# Patient Record
Sex: FEMALE | Race: OTHER | HISPANIC OR LATINO | Employment: UNEMPLOYED | URBAN - METROPOLITAN AREA
[De-identification: names, ages, dates, MRNs, and addresses within clinical notes are randomized per-mention and may not be internally consistent; named-entity substitution may affect disease eponyms.]

---

## 2022-02-21 ENCOUNTER — ANESTHESIA (OUTPATIENT)
Dept: ENDOSCOPY | Facility: HOSPITAL | Age: 52
DRG: 381 | End: 2022-02-21
Payer: MEDICAID

## 2022-02-21 ENCOUNTER — HOSPITAL ENCOUNTER (INPATIENT)
Facility: HOSPITAL | Age: 52
LOS: 1 days | Discharge: HOME | DRG: 381 | End: 2022-02-22
Attending: EMERGENCY MEDICINE | Admitting: INTERNAL MEDICINE
Payer: MEDICAID

## 2022-02-21 ENCOUNTER — ANESTHESIA EVENT (OUTPATIENT)
Dept: ENDOSCOPY | Facility: HOSPITAL | Age: 52
DRG: 381 | End: 2022-02-21
Payer: MEDICAID

## 2022-02-21 DIAGNOSIS — K92.2 GI BLEED DUE TO NSAIDS: ICD-10-CM

## 2022-02-21 DIAGNOSIS — K92.2 ACUTE UPPER GI BLEED: Primary | ICD-10-CM

## 2022-02-21 DIAGNOSIS — R10.13 EPIGASTRIC PAIN: ICD-10-CM

## 2022-02-21 DIAGNOSIS — T39.395A GI BLEED DUE TO NSAIDS: ICD-10-CM

## 2022-02-21 DIAGNOSIS — K92.0 HEMATEMESIS WITH NAUSEA: ICD-10-CM

## 2022-02-21 DIAGNOSIS — D64.9 SYMPTOMATIC ANEMIA: ICD-10-CM

## 2022-02-21 PROBLEM — F15.10 AMPHETAMINE ABUSE: Status: ACTIVE | Noted: 2022-02-21

## 2022-02-21 PROBLEM — Z79.1 NSAID LONG-TERM USE: Chronic | Status: ACTIVE | Noted: 2022-02-21

## 2022-02-21 PROBLEM — K22.11 ULCER OF ESOPHAGUS WITH BLEEDING: Status: ACTIVE | Noted: 2022-02-21

## 2022-02-21 PROBLEM — I10 ESSENTIAL HYPERTENSION: Status: ACTIVE | Noted: 2022-02-21

## 2022-02-21 PROBLEM — D62 ACUTE BLOOD LOSS ANEMIA: Status: ACTIVE | Noted: 2022-02-21

## 2022-02-21 PROBLEM — Z79.1 NSAID LONG-TERM USE: Status: ACTIVE | Noted: 2022-02-21

## 2022-02-21 LAB
ABO + RH BLD: NORMAL
ALBUMIN SERPL BCP-MCNC: 2.8 G/DL (ref 3.5–5.2)
ALLENS TEST: ABNORMAL
ALP SERPL-CCNC: 103 U/L (ref 55–135)
ALT SERPL W/O P-5'-P-CCNC: 26 U/L (ref 10–44)
AMPHET+METHAMPHET UR QL: ABNORMAL
ANION GAP SERPL CALC-SCNC: 6 MMOL/L (ref 8–16)
APTT BLDCRRT: 22.8 SEC (ref 21–32)
AST SERPL-CCNC: 23 U/L (ref 10–40)
BARBITURATES UR QL SCN>200 NG/ML: NEGATIVE
BASOPHILS # BLD AUTO: 0.04 K/UL (ref 0–0.2)
BASOPHILS # BLD AUTO: 0.06 K/UL (ref 0–0.2)
BASOPHILS # BLD AUTO: 0.07 K/UL (ref 0–0.2)
BASOPHILS NFR BLD: 0.3 % (ref 0–1.9)
BASOPHILS NFR BLD: 0.4 % (ref 0–1.9)
BASOPHILS NFR BLD: 0.5 % (ref 0–1.9)
BENZODIAZ UR QL SCN>200 NG/ML: NEGATIVE
BILIRUB SERPL-MCNC: 0.1 MG/DL (ref 0.1–1)
BILIRUB UR QL STRIP: NEGATIVE
BLD GP AB SCN CELLS X3 SERPL QL: NORMAL
BLD PROD TYP BPU: NORMAL
BLD PROD TYP BPU: NORMAL
BLOOD UNIT EXPIRATION DATE: NORMAL
BLOOD UNIT EXPIRATION DATE: NORMAL
BLOOD UNIT TYPE CODE: 9500
BLOOD UNIT TYPE CODE: 9500
BLOOD UNIT TYPE: NORMAL
BLOOD UNIT TYPE: NORMAL
BUN SERPL-MCNC: 21 MG/DL (ref 6–20)
BZE UR QL SCN: NEGATIVE
CALCIUM SERPL-MCNC: 8.1 MG/DL (ref 8.7–10.5)
CANNABINOIDS UR QL SCN: NEGATIVE
CHLORIDE SERPL-SCNC: 107 MMOL/L (ref 95–110)
CLARITY UR: CLEAR
CO2 SERPL-SCNC: 25 MMOL/L (ref 23–29)
CODING SYSTEM: NORMAL
CODING SYSTEM: NORMAL
COLOR UR: YELLOW
CREAT SERPL-MCNC: 0.7 MG/DL (ref 0.5–1.4)
CREAT UR-MCNC: 30.5 MG/DL (ref 15–325)
CTP QC/QA: YES
DELSYS: ABNORMAL
DIFFERENTIAL METHOD: ABNORMAL
DISPENSE STATUS: NORMAL
DISPENSE STATUS: NORMAL
EOSINOPHIL # BLD AUTO: 0.1 K/UL (ref 0–0.5)
EOSINOPHIL # BLD AUTO: 0.1 K/UL (ref 0–0.5)
EOSINOPHIL # BLD AUTO: 0.2 K/UL (ref 0–0.5)
EOSINOPHIL NFR BLD: 0.4 % (ref 0–8)
EOSINOPHIL NFR BLD: 0.5 % (ref 0–8)
EOSINOPHIL NFR BLD: 1.2 % (ref 0–8)
ERYTHROCYTE [DISTWIDTH] IN BLOOD BY AUTOMATED COUNT: 15.7 % (ref 11.5–14.5)
ERYTHROCYTE [DISTWIDTH] IN BLOOD BY AUTOMATED COUNT: 16.2 % (ref 11.5–14.5)
ERYTHROCYTE [DISTWIDTH] IN BLOOD BY AUTOMATED COUNT: 16.3 % (ref 11.5–14.5)
EST. GFR  (AFRICAN AMERICAN): >60 ML/MIN/1.73 M^2
EST. GFR  (NON AFRICAN AMERICAN): >60 ML/MIN/1.73 M^2
GLUCOSE SERPL-MCNC: 136 MG/DL (ref 70–110)
GLUCOSE SERPL-MCNC: 145 MG/DL (ref 70–110)
GLUCOSE UR QL STRIP: NEGATIVE
HCO3 UR-SCNC: 23.5 MMOL/L (ref 24–28)
HCT VFR BLD AUTO: 18.1 % (ref 37–48.5)
HCT VFR BLD AUTO: 23.4 % (ref 37–48.5)
HCT VFR BLD AUTO: 27.4 % (ref 37–48.5)
HCT VFR BLD AUTO: 30.5 % (ref 37–48.5)
HCT VFR BLD CALC: <15 %PCV (ref 36–54)
HGB BLD-MCNC: 5.5 G/DL (ref 12–16)
HGB BLD-MCNC: 7.2 G/DL (ref 12–16)
HGB BLD-MCNC: 8.9 G/DL (ref 12–16)
HGB BLD-MCNC: 9.7 G/DL (ref 12–16)
HGB UR QL STRIP: NEGATIVE
IMM GRANULOCYTES # BLD AUTO: 0.21 K/UL (ref 0–0.04)
IMM GRANULOCYTES # BLD AUTO: 0.23 K/UL (ref 0–0.04)
IMM GRANULOCYTES # BLD AUTO: 0.3 K/UL (ref 0–0.04)
IMM GRANULOCYTES NFR BLD AUTO: 1.7 % (ref 0–0.5)
IMM GRANULOCYTES NFR BLD AUTO: 1.8 % (ref 0–0.5)
IMM GRANULOCYTES NFR BLD AUTO: 1.8 % (ref 0–0.5)
INR PPP: 0.9 (ref 0.8–1.2)
KETONES UR QL STRIP: NEGATIVE
LEUKOCYTE ESTERASE UR QL STRIP: NEGATIVE
LIPASE SERPL-CCNC: 53 U/L (ref 4–60)
LYMPHOCYTES # BLD AUTO: 1.6 K/UL (ref 1–4.8)
LYMPHOCYTES # BLD AUTO: 2.3 K/UL (ref 1–4.8)
LYMPHOCYTES # BLD AUTO: 2.6 K/UL (ref 1–4.8)
LYMPHOCYTES NFR BLD: 12.8 % (ref 18–48)
LYMPHOCYTES NFR BLD: 14.3 % (ref 18–48)
LYMPHOCYTES NFR BLD: 20.8 % (ref 18–48)
MCH RBC QN AUTO: 26.7 PG (ref 27–31)
MCH RBC QN AUTO: 26.9 PG (ref 27–31)
MCH RBC QN AUTO: 27.1 PG (ref 27–31)
MCHC RBC AUTO-ENTMCNC: 30.4 G/DL (ref 32–36)
MCHC RBC AUTO-ENTMCNC: 30.8 G/DL (ref 32–36)
MCHC RBC AUTO-ENTMCNC: 31.8 G/DL (ref 32–36)
MCV RBC AUTO: 85 FL (ref 82–98)
MCV RBC AUTO: 88 FL (ref 82–98)
MCV RBC AUTO: 88 FL (ref 82–98)
METHADONE UR QL SCN>300 NG/ML: NEGATIVE
MONOCYTES # BLD AUTO: 0.7 K/UL (ref 0.3–1)
MONOCYTES # BLD AUTO: 0.7 K/UL (ref 0.3–1)
MONOCYTES # BLD AUTO: 1.3 K/UL (ref 0.3–1)
MONOCYTES NFR BLD: 5.7 % (ref 4–15)
MONOCYTES NFR BLD: 5.8 % (ref 4–15)
MONOCYTES NFR BLD: 8.1 % (ref 4–15)
NEUTROPHILS # BLD AUTO: 10 K/UL (ref 1.8–7.7)
NEUTROPHILS # BLD AUTO: 12.3 K/UL (ref 1.8–7.7)
NEUTROPHILS # BLD AUTO: 8.9 K/UL (ref 1.8–7.7)
NEUTROPHILS NFR BLD: 70.2 % (ref 38–73)
NEUTROPHILS NFR BLD: 75 % (ref 38–73)
NEUTROPHILS NFR BLD: 78.7 % (ref 38–73)
NITRITE UR QL STRIP: NEGATIVE
NRBC BLD-RTO: 2 /100 WBC
NRBC BLD-RTO: 2 /100 WBC
NRBC BLD-RTO: 3 /100 WBC
NUM UNITS TRANS PACKED RBC: NORMAL
NUM UNITS TRANS PACKED RBC: NORMAL
OB PNL GAST: POSITIVE
OPIATES UR QL SCN: NEGATIVE
PCO2 BLDA: 40.5 MMHG (ref 35–45)
PCP UR QL SCN>25 NG/ML: NEGATIVE
PH SMN: 7.37 [PH] (ref 7.35–7.45)
PH UR STRIP: 7 [PH] (ref 5–8)
PLATELET # BLD AUTO: 234 K/UL (ref 150–450)
PLATELET # BLD AUTO: 250 K/UL (ref 150–450)
PLATELET # BLD AUTO: 308 K/UL (ref 150–450)
PLATELET BLD QL SMEAR: ABNORMAL
PMV BLD AUTO: 9.4 FL (ref 9.2–12.9)
PMV BLD AUTO: 9.8 FL (ref 9.2–12.9)
PMV BLD AUTO: 9.9 FL (ref 9.2–12.9)
PO2 BLDA: 91 MMHG (ref 80–100)
POC BE: -2 MMOL/L
POC IONIZED CALCIUM: 1.14 MMOL/L (ref 1.06–1.42)
POC SATURATED O2: 97 % (ref 95–100)
POTASSIUM BLD-SCNC: 3.4 MMOL/L (ref 3.5–5.1)
POTASSIUM SERPL-SCNC: 4 MMOL/L (ref 3.5–5.1)
PROT SERPL-MCNC: 5.8 G/DL (ref 6–8.4)
PROT UR QL STRIP: NEGATIVE
PROTHROMBIN TIME: 10.5 SEC (ref 9–12.5)
RBC # BLD AUTO: 2.06 M/UL (ref 4–5.4)
RBC # BLD AUTO: 2.66 M/UL (ref 4–5.4)
RBC # BLD AUTO: 3.61 M/UL (ref 4–5.4)
SAMPLE: ABNORMAL
SARS-COV-2 RDRP RESP QL NAA+PROBE: NEGATIVE
SITE: ABNORMAL
SODIUM BLD-SCNC: 140 MMOL/L (ref 136–145)
SODIUM SERPL-SCNC: 138 MMOL/L (ref 136–145)
SP GR UR STRIP: 1.01 (ref 1–1.03)
TOXICOLOGY INFORMATION: ABNORMAL
URN SPEC COLLECT METH UR: NORMAL
UROBILINOGEN UR STRIP-ACNC: NEGATIVE EU/DL
WBC # BLD AUTO: 12.67 K/UL (ref 3.9–12.7)
WBC # BLD AUTO: 12.68 K/UL (ref 3.9–12.7)
WBC # BLD AUTO: 16.36 K/UL (ref 3.9–12.7)

## 2022-02-21 PROCEDURE — 43239 PR EGD, FLEX, W/BIOPSY, SGL/MULTI: ICD-10-PCS | Mod: ,,, | Performed by: INTERNAL MEDICINE

## 2022-02-21 PROCEDURE — 27201028 HC NEEDLE, SCLERO: Performed by: INTERNAL MEDICINE

## 2022-02-21 PROCEDURE — P9016 RBC LEUKOCYTES REDUCED: HCPCS | Performed by: NURSE PRACTITIONER

## 2022-02-21 PROCEDURE — 96374 THER/PROPH/DIAG INJ IV PUSH: CPT | Mod: 59

## 2022-02-21 PROCEDURE — 85610 PROTHROMBIN TIME: CPT | Performed by: EMERGENCY MEDICINE

## 2022-02-21 PROCEDURE — 99291 CRITICAL CARE FIRST HOUR: CPT | Mod: 25

## 2022-02-21 PROCEDURE — 25000003 PHARM REV CODE 250: Performed by: INTERNAL MEDICINE

## 2022-02-21 PROCEDURE — 81003 URINALYSIS AUTO W/O SCOPE: CPT | Mod: 59 | Performed by: NURSE PRACTITIONER

## 2022-02-21 PROCEDURE — 25000003 PHARM REV CODE 250: Performed by: NURSE PRACTITIONER

## 2022-02-21 PROCEDURE — C9113 INJ PANTOPRAZOLE SODIUM, VIA: HCPCS | Performed by: EMERGENCY MEDICINE

## 2022-02-21 PROCEDURE — 96361 HYDRATE IV INFUSION ADD-ON: CPT | Mod: 59

## 2022-02-21 PROCEDURE — C9113 INJ PANTOPRAZOLE SODIUM, VIA: HCPCS | Performed by: NURSE PRACTITIONER

## 2022-02-21 PROCEDURE — 88305 TISSUE EXAM BY PATHOLOGIST: CPT | Mod: 26,,, | Performed by: PATHOLOGY

## 2022-02-21 PROCEDURE — 88305 TISSUE EXAM BY PATHOLOGIST: CPT | Mod: 59 | Performed by: PATHOLOGY

## 2022-02-21 PROCEDURE — 99291 CRITICAL CARE FIRST HOUR: CPT | Mod: ,,, | Performed by: NURSE PRACTITIONER

## 2022-02-21 PROCEDURE — 88305 TISSUE EXAM BY PATHOLOGIST: ICD-10-PCS | Mod: 26,,, | Performed by: PATHOLOGY

## 2022-02-21 PROCEDURE — 82271 OCCULT BLOOD OTHER SOURCES: CPT | Performed by: EMERGENCY MEDICINE

## 2022-02-21 PROCEDURE — 99223 1ST HOSP IP/OBS HIGH 75: CPT | Mod: 25,,, | Performed by: INTERNAL MEDICINE

## 2022-02-21 PROCEDURE — 27201038 HC PROBE, BI-POLAR: Performed by: INTERNAL MEDICINE

## 2022-02-21 PROCEDURE — 85014 HEMATOCRIT: CPT | Performed by: INTERNAL MEDICINE

## 2022-02-21 PROCEDURE — 25000003 PHARM REV CODE 250: Performed by: EMERGENCY MEDICINE

## 2022-02-21 PROCEDURE — 36415 COLL VENOUS BLD VENIPUNCTURE: CPT | Performed by: EMERGENCY MEDICINE

## 2022-02-21 PROCEDURE — 85025 COMPLETE CBC W/AUTO DIFF WBC: CPT | Performed by: EMERGENCY MEDICINE

## 2022-02-21 PROCEDURE — 80053 COMPREHEN METABOLIC PANEL: CPT | Performed by: EMERGENCY MEDICINE

## 2022-02-21 PROCEDURE — 99291 PR CRITICAL CARE, E/M 30-74 MINUTES: ICD-10-PCS | Mod: ,,, | Performed by: NURSE PRACTITIONER

## 2022-02-21 PROCEDURE — 85018 HEMOGLOBIN: CPT | Performed by: INTERNAL MEDICINE

## 2022-02-21 PROCEDURE — 63600175 PHARM REV CODE 636 W HCPCS: Performed by: INTERNAL MEDICINE

## 2022-02-21 PROCEDURE — 84295 ASSAY OF SERUM SODIUM: CPT

## 2022-02-21 PROCEDURE — 63600175 PHARM REV CODE 636 W HCPCS: Performed by: EMERGENCY MEDICINE

## 2022-02-21 PROCEDURE — 25000003 PHARM REV CODE 250: Performed by: NURSE ANESTHETIST, CERTIFIED REGISTERED

## 2022-02-21 PROCEDURE — 99223 PR INITIAL HOSPITAL CARE,LEVL III: ICD-10-PCS | Mod: 25,,, | Performed by: INTERNAL MEDICINE

## 2022-02-21 PROCEDURE — 86920 COMPATIBILITY TEST SPIN: CPT | Performed by: NURSE PRACTITIONER

## 2022-02-21 PROCEDURE — 43255 EGD CONTROL BLEEDING ANY: CPT | Mod: 59,,, | Performed by: INTERNAL MEDICINE

## 2022-02-21 PROCEDURE — 82800 BLOOD PH: CPT

## 2022-02-21 PROCEDURE — 43255 EGD CONTROL BLEEDING ANY: CPT | Performed by: INTERNAL MEDICINE

## 2022-02-21 PROCEDURE — 99900035 HC TECH TIME PER 15 MIN (STAT)

## 2022-02-21 PROCEDURE — 85014 HEMATOCRIT: CPT

## 2022-02-21 PROCEDURE — 83690 ASSAY OF LIPASE: CPT | Performed by: EMERGENCY MEDICINE

## 2022-02-21 PROCEDURE — 96375 TX/PRO/DX INJ NEW DRUG ADDON: CPT | Mod: 59

## 2022-02-21 PROCEDURE — 80307 DRUG TEST PRSMV CHEM ANLYZR: CPT | Performed by: NURSE PRACTITIONER

## 2022-02-21 PROCEDURE — 85730 THROMBOPLASTIN TIME PARTIAL: CPT | Performed by: EMERGENCY MEDICINE

## 2022-02-21 PROCEDURE — 86900 BLOOD TYPING SEROLOGIC ABO: CPT | Performed by: EMERGENCY MEDICINE

## 2022-02-21 PROCEDURE — 82803 BLOOD GASES ANY COMBINATION: CPT

## 2022-02-21 PROCEDURE — U0002 COVID-19 LAB TEST NON-CDC: HCPCS | Performed by: EMERGENCY MEDICINE

## 2022-02-21 PROCEDURE — 63600175 PHARM REV CODE 636 W HCPCS: Performed by: NURSE ANESTHETIST, CERTIFIED REGISTERED

## 2022-02-21 PROCEDURE — 63600175 PHARM REV CODE 636 W HCPCS: Performed by: NURSE PRACTITIONER

## 2022-02-21 PROCEDURE — 83036 HEMOGLOBIN GLYCOSYLATED A1C: CPT | Performed by: EMERGENCY MEDICINE

## 2022-02-21 PROCEDURE — 86920 COMPATIBILITY TEST SPIN: CPT | Performed by: EMERGENCY MEDICINE

## 2022-02-21 PROCEDURE — 82330 ASSAY OF CALCIUM: CPT

## 2022-02-21 PROCEDURE — 43239 EGD BIOPSY SINGLE/MULTIPLE: CPT | Mod: ,,, | Performed by: INTERNAL MEDICINE

## 2022-02-21 PROCEDURE — 84132 ASSAY OF SERUM POTASSIUM: CPT

## 2022-02-21 PROCEDURE — 43255 PR EGD, FLEX, W/CTRL BLEED, ANY METHOD: ICD-10-PCS | Mod: 59,,, | Performed by: INTERNAL MEDICINE

## 2022-02-21 PROCEDURE — 85025 COMPLETE CBC W/AUTO DIFF WBC: CPT | Mod: 91 | Performed by: NURSE PRACTITIONER

## 2022-02-21 PROCEDURE — P9016 RBC LEUKOCYTES REDUCED: HCPCS | Performed by: EMERGENCY MEDICINE

## 2022-02-21 PROCEDURE — 43239 EGD BIOPSY SINGLE/MULTIPLE: CPT | Performed by: INTERNAL MEDICINE

## 2022-02-21 PROCEDURE — 86850 RBC ANTIBODY SCREEN: CPT | Performed by: EMERGENCY MEDICINE

## 2022-02-21 PROCEDURE — 37000008 HC ANESTHESIA 1ST 15 MINUTES: Performed by: INTERNAL MEDICINE

## 2022-02-21 PROCEDURE — 37000009 HC ANESTHESIA EA ADD 15 MINS: Performed by: INTERNAL MEDICINE

## 2022-02-21 PROCEDURE — 20000000 HC ICU ROOM

## 2022-02-21 PROCEDURE — 36430 TRANSFUSION BLD/BLD COMPNT: CPT

## 2022-02-21 PROCEDURE — 36600 WITHDRAWAL OF ARTERIAL BLOOD: CPT

## 2022-02-21 PROCEDURE — 27201012 HC FORCEPS, HOT/COLD, DISP: Performed by: INTERNAL MEDICINE

## 2022-02-21 RX ORDER — ALBUTEROL SULFATE 90 UG/1
2 AEROSOL, METERED RESPIRATORY (INHALATION) EVERY 6 HOURS PRN
Status: ON HOLD | COMMUNITY
End: 2022-02-22

## 2022-02-21 RX ORDER — EPINEPHRINE 1 MG/ML
INJECTION, SOLUTION INTRACARDIAC; INTRAMUSCULAR; INTRAVENOUS; SUBCUTANEOUS
Status: COMPLETED | OUTPATIENT
Start: 2022-02-21 | End: 2022-02-21

## 2022-02-21 RX ORDER — PANTOPRAZOLE SODIUM 40 MG/10ML
80 INJECTION, POWDER, LYOPHILIZED, FOR SOLUTION INTRAVENOUS
Status: COMPLETED | OUTPATIENT
Start: 2022-02-21 | End: 2022-02-21

## 2022-02-21 RX ORDER — IBUPROFEN 200 MG
400 TABLET ORAL EVERY 6 HOURS PRN
Status: ON HOLD | COMMUNITY
End: 2022-02-22

## 2022-02-21 RX ORDER — HYDROCODONE BITARTRATE AND ACETAMINOPHEN 500; 5 MG/1; MG/1
TABLET ORAL
Status: DISCONTINUED | OUTPATIENT
Start: 2022-02-21 | End: 2022-02-21

## 2022-02-21 RX ORDER — ONDANSETRON 2 MG/ML
4 INJECTION INTRAMUSCULAR; INTRAVENOUS EVERY 8 HOURS PRN
Status: DISCONTINUED | OUTPATIENT
Start: 2022-02-21 | End: 2022-02-22 | Stop reason: HOSPADM

## 2022-02-21 RX ORDER — HYDROCODONE BITARTRATE AND ACETAMINOPHEN 5; 325 MG/1; MG/1
1 TABLET ORAL EVERY 6 HOURS PRN
Status: DISCONTINUED | OUTPATIENT
Start: 2022-02-21 | End: 2022-02-22 | Stop reason: HOSPADM

## 2022-02-21 RX ORDER — SUCRALFATE 1 G/10ML
1 SUSPENSION ORAL EVERY 6 HOURS
Status: DISCONTINUED | OUTPATIENT
Start: 2022-02-21 | End: 2022-02-22 | Stop reason: HOSPADM

## 2022-02-21 RX ORDER — LIDOCAINE HYDROCHLORIDE 10 MG/ML
INJECTION, SOLUTION EPIDURAL; INFILTRATION; INTRACAUDAL; PERINEURAL
Status: DISCONTINUED | OUTPATIENT
Start: 2022-02-21 | End: 2022-02-21

## 2022-02-21 RX ORDER — AMLODIPINE BESYLATE 10 MG/1
10 TABLET ORAL DAILY
Status: DISCONTINUED | OUTPATIENT
Start: 2022-02-21 | End: 2022-02-22 | Stop reason: HOSPADM

## 2022-02-21 RX ORDER — PANTOPRAZOLE SODIUM 40 MG/10ML
40 INJECTION, POWDER, LYOPHILIZED, FOR SOLUTION INTRAVENOUS 2 TIMES DAILY
Status: DISCONTINUED | OUTPATIENT
Start: 2022-02-22 | End: 2022-02-21 | Stop reason: SDUPTHER

## 2022-02-21 RX ORDER — HYDRALAZINE HYDROCHLORIDE 20 MG/ML
10 INJECTION INTRAMUSCULAR; INTRAVENOUS EVERY 6 HOURS PRN
Status: DISCONTINUED | OUTPATIENT
Start: 2022-02-21 | End: 2022-02-22 | Stop reason: HOSPADM

## 2022-02-21 RX ORDER — PROPOFOL 10 MG/ML
VIAL (ML) INTRAVENOUS
Status: DISCONTINUED | OUTPATIENT
Start: 2022-02-21 | End: 2022-02-21

## 2022-02-21 RX ORDER — PANTOPRAZOLE SODIUM 40 MG/10ML
40 INJECTION, POWDER, LYOPHILIZED, FOR SOLUTION INTRAVENOUS 2 TIMES DAILY
Status: DISCONTINUED | OUTPATIENT
Start: 2022-02-21 | End: 2022-02-22 | Stop reason: HOSPADM

## 2022-02-21 RX ORDER — EPINEPHRINE 1 MG/ML
1 INJECTION, SOLUTION INTRACARDIAC; INTRAMUSCULAR; INTRAVENOUS; SUBCUTANEOUS ONCE
Status: DISCONTINUED | OUTPATIENT
Start: 2022-02-21 | End: 2022-02-22 | Stop reason: HOSPADM

## 2022-02-21 RX ORDER — SODIUM CHLORIDE 9 MG/ML
INJECTION, SOLUTION INTRAVENOUS CONTINUOUS
Status: DISCONTINUED | OUTPATIENT
Start: 2022-02-21 | End: 2022-02-22 | Stop reason: HOSPADM

## 2022-02-21 RX ORDER — MUPIROCIN 20 MG/G
OINTMENT TOPICAL 2 TIMES DAILY
Status: DISCONTINUED | OUTPATIENT
Start: 2022-02-21 | End: 2022-02-22 | Stop reason: HOSPADM

## 2022-02-21 RX ADMIN — LIDOCAINE HYDROCHLORIDE 50 MG: 10 INJECTION, SOLUTION EPIDURAL; INFILTRATION; INTRACAUDAL; PERINEURAL at 02:02

## 2022-02-21 RX ADMIN — PROMETHAZINE HYDROCHLORIDE 25 MG: 25 INJECTION INTRAMUSCULAR; INTRAVENOUS at 09:02

## 2022-02-21 RX ADMIN — SUCRALFATE 1 G: 1 SUSPENSION ORAL at 04:02

## 2022-02-21 RX ADMIN — MUPIROCIN: 20 OINTMENT TOPICAL at 09:02

## 2022-02-21 RX ADMIN — AMLODIPINE BESYLATE 10 MG: 10 TABLET ORAL at 04:02

## 2022-02-21 RX ADMIN — HYDRALAZINE HYDROCHLORIDE 10 MG: 20 INJECTION, SOLUTION INTRAMUSCULAR; INTRAVENOUS at 06:02

## 2022-02-21 RX ADMIN — PANTOPRAZOLE SODIUM 40 MG: 40 INJECTION, POWDER, FOR SOLUTION INTRAVENOUS at 09:02

## 2022-02-21 RX ADMIN — SODIUM CHLORIDE: 0.9 INJECTION, SOLUTION INTRAVENOUS at 11:02

## 2022-02-21 RX ADMIN — EPINEPHRINE 0.3 MG: 1 INJECTION, SOLUTION INTRAMUSCULAR; SUBCUTANEOUS at 03:02

## 2022-02-21 RX ADMIN — PROPOFOL 50 MG: 10 INJECTION, EMULSION INTRAVENOUS at 02:02

## 2022-02-21 RX ADMIN — PANTOPRAZOLE SODIUM 80 MG: 40 INJECTION, POWDER, FOR SOLUTION INTRAVENOUS at 09:02

## 2022-02-21 RX ADMIN — SUCRALFATE 1 G: 1 SUSPENSION ORAL at 11:02

## 2022-02-21 RX ADMIN — PROPOFOL 50 MG: 10 INJECTION, EMULSION INTRAVENOUS at 03:02

## 2022-02-21 RX ADMIN — PROPOFOL 40 MG: 10 INJECTION, EMULSION INTRAVENOUS at 03:02

## 2022-02-21 RX ADMIN — SODIUM CHLORIDE 2000 ML: 0.9 INJECTION, SOLUTION INTRAVENOUS at 09:02

## 2022-02-21 NOTE — ED NOTES
Per GI, bolus rest of blood into pt. Hospital medicine notified of rate change. No signs of reaction

## 2022-02-21 NOTE — CONSULTS
[facility] - Emergency Dept.  Gastroenterology  Consult Note    Patient Name: Kristen Gates  MRN: [id number]  Admission Date: 2/21/2022  Hospital Length of Stay: 0 days  Code Status: No Order   Attending Provider: Ирина Carlson MD   Consulting Provider: Ottoniel Jaramillo PA-C  Primary Care Physician: No primary care provider on file.  Principal Problem:Hematemesis with nausea    Inpatient consult to Gastroenterology  Consult performed by: Ottoniel Jaramillo PA-C  Consult ordered by: Ирина Carlson MD  Reason for consult: Upper GI bleed        Subjective:     HPI:  The patient presented to the ER for weakness. Symptoms started three days ago. She describes nausea, decreased appetite, black stool and weakness. In the ER, she vomited coffee ground emesis several times. Labs showed a Hgb of 5.5. She is receiving emergent RBCs now. BUN 21 and creatinine 0.7. Vitals stable. She reports taking Ibuprofen three times a day for right leg pain.       No past medical history on file.    No past surgical history on file.    Review of patient's allergies indicates:  Not on File  Family History    None       Tobacco Use    Smoking status: Not on file    Smokeless tobacco: Not on file   Substance and Sexual Activity    Alcohol use: Not on file    Drug use: Not on file    Sexual activity: Not on file     Review of Systems   Constitutional:  Negative for fever.   HENT:  Negative for hearing loss.    Eyes:  Negative for visual disturbance.   Respiratory:  Positive for shortness of breath. Negative for cough.    Cardiovascular:  Negative for chest pain.   Gastrointestinal:         As per HPI.   Genitourinary:  Negative for dysuria, frequency and hematuria.   Musculoskeletal:  Positive for arthralgias and back pain.   Skin:  Negative for rash.   Neurological:  Positive for weakness. Negative for seizures, syncope, numbness and headaches.   Hematological:  Does not bruise/bleed easily.   Psychiatric/Behavioral:  The patient is not  nervous/anxious.    Objective:     Vital Signs (Most Recent):  Temp: 97.9 °F (36.6 °C) (02/21/22 0952)  Pulse: 82 (02/21/22 0952)  Resp: (!) 21 (02/21/22 0952)  BP: (!) 152/81 (02/21/22 0952)  SpO2: 100 % (02/21/22 0952)   Vital Signs (24h Range):  Temp:  [97.9 °F (36.6 °C)-98.4 °F (36.9 °C)] 97.9 °F (36.6 °C)  Pulse:  [77-85] 82  Resp:  [10-21] 21  SpO2:  [97 %-100 %] 100 %  BP: (129-152)/(81-90) 152/81     Weight: 80.4 kg (177 lb 4 oz) (02/21/22 0918)  Body mass index is 29.5 kg/m².      Intake/Output Summary (Last 24 hours) at 2/21/2022 1021  Last data filed at 2/21/2022 1013  Gross per 24 hour   Intake 1991.67 ml   Output --   Net 1991.67 ml       Lines/Drains/Airways       Peripheral Intravenous Line  Duration                  Peripheral IV - Single Lumen 02/21/22 0000 18 G Right Antecubital <1 day         Peripheral IV - Single Lumen 02/21/22 0919 18 G Left Antecubital <1 day                    Physical Exam  Constitutional:       Appearance: Normal appearance. She is well-developed. She is ill-appearing.   HENT:      Head: Normocephalic and atraumatic.      Mouth/Throat:      Dentition: Abnormal dentition.   Eyes:      Extraocular Movements: Extraocular movements intact.   Cardiovascular:      Rate and Rhythm: Normal rate and regular rhythm.      Heart sounds: No murmur heard.  Pulmonary:      Effort: Pulmonary effort is normal. No respiratory distress.      Breath sounds: Normal breath sounds. No wheezing.   Abdominal:      General: Bowel sounds are normal. There is no distension.      Palpations: Abdomen is soft. There is no mass.      Tenderness: There is abdominal tenderness in the epigastric area.   Musculoskeletal:      Cervical back: Normal range of motion and neck supple.      Right lower leg: No edema.      Left lower leg: No edema.   Skin:     General: Skin is warm and dry.      Findings: No rash.   Neurological:      Mental Status: She is alert and oriented to person, place, and time.      Cranial  Nerves: No cranial nerve deficit.   Psychiatric:         Behavior: Behavior normal.       Significant Labs:  CBC:   Recent Labs   Lab 02/21/22 0921   WBC 12.67   HGB 5.5*   HCT 18.1*        CMP:   Recent Labs   Lab 02/21/22 0921   *   CALCIUM 8.1*   ALBUMIN 2.8*   PROT 5.8*      K 4.0   CO2 25      BUN 21*   CREATININE 0.7   ALKPHOS 103   ALT 26   AST 23   BILITOT 0.1     Coagulation:   Recent Labs   Lab 02/21/22 0921   INR 0.9   APTT 22.8       Significant Imaging:  Imaging results within the past 24 hours have been reviewed.    Assessment/Plan:     * Hematemesis with nausea  The patient has coffee ground emesis and melena in the setting of chronic NSAID use. Upper GI bleed suspected.   Agree with blood transfusion and IV Protonix.   Will plan on EGD today or tomorrow depending on resuscitation.   Keep NPO.      Acute blood loss anemia  Continue to monitor H/H and transfuse additional units as needed.     NSAID long-term use  Avoid NSAIDs.        Thank you for your consult. I will follow-up with patient. Please contact us if you have any additional questions.    Ottoniel Jaramillo PA-C  Gastroenterology  [facility] - Emergency Dept.

## 2022-02-21 NOTE — ANESTHESIA PREPROCEDURE EVALUATION
02/21/2022  Kristen Gates is a 51 y.o., female.      Pre-op Assessment    I have reviewed the Patient Summary Reports.     I have reviewed the Nursing Notes. I have reviewed the NPO Status.   I have reviewed the Medications.     Review of Systems  Anesthesia Hx:  No problems with previous Anesthesia    Social:  Non-Smoker, No Alcohol Use    Hematology/Oncology:     Oncology Normal    -- Anemia:   EENT/Dental:EENT/Dental Normal   Cardiovascular:  Cardiovascular Normal     Pulmonary:   Asthma mild    Renal/:  Renal/ Normal     Hepatic/GI:  Hepatic/GI Normal    Musculoskeletal:  Musculoskeletal Normal    Neurological:  Neurology Normal    Endocrine:  Endocrine Normal    Dermatological:  Skin Normal    Psych:   anxiety depression          Physical Exam  General: Well nourished    Airway:  Mallampati: II   Mouth Opening: Normal  TM Distance: Normal  Neck ROM: Normal ROM    Dental:  Periodontal disease    Chest/Lungs:  Normal Respiratory Rate    Heart:  Rate: Normal        Anesthesia Plan  Type of Anesthesia, risks & benefits discussed:    Anesthesia Type: MAC  Intra-op Monitoring Plan: Standard ASA Monitors  Post Op Pain Control Plan: multimodal analgesia  Induction:  IV  Airway Plan: Direct, Post-Induction  Informed Consent: Informed consent signed with the Patient and all parties understand the risks and agree with anesthesia plan.  All questions answered. Patient consented to blood products? Yes  ASA Score: 3 Emergent  Day of Surgery Review of History & Physical: I have interviewed and examined the patient. I have reviewed the patient's H&P dated: There are no significant changes.     Ready For Surgery From Anesthesia Perspective.     .

## 2022-02-21 NOTE — TRANSFER OF CARE
"Anesthesia Transfer of Care Note    Patient: Kristen Gates    Procedure(s) Performed: Procedure(s) (LRB):  EGD (ESOPHAGOGASTRODUODENOSCOPY) (N/A)    Patient location: ICU    Anesthesia Type: MAC    Transport from OR: Transported from OR on room air with adequate spontaneous ventilation    Post pain: adequate analgesia    Post assessment: no apparent anesthetic complications and tolerated procedure well    Post vital signs: stable    Level of consciousness: awake, alert and oriented    Nausea/Vomiting: no nausea/vomiting    Complications: none    Transfer of care protocol was followed      Last vitals:   Visit Vitals  BP (!) 175/98   Pulse 79   Temp 36.8 °C (98.2 °F) (Oral)   Resp 16   Ht 5' 5" (1.651 m)   Wt 80.3 kg (177 lb)   LMP  (LMP Unknown)   SpO2 100%   Breastfeeding No   BMI 29.45 kg/m²     "

## 2022-02-21 NOTE — H&P
[facility] - Emergency Dept.  [facility] Medicine  History & Physical    Patient Name: Kristen Gates  MRN: [id number]  Patient Class: IP- Inpatient  Admission Date: 2022  Attending Physician: Wilbert Carroll MD  Primary Care Provider: Primary Doctor No         Patient information was obtained from patient, relative(s) and ER records.     Subjective:     Principal Problem:Hematemesis with nausea    Chief Complaint:   Chief Complaint   Patient presents with    Fatigue     Weakness x 3 days        HPI: Kristen Gates is a 51 y.o. female who presented to the Emergency Department today with c/o generalized weakness for the last 3 days days. It has been constant and of moderate severity.  Aggravating factors include none. Alleviating factors include none. Associated symptoms include fatigue, melena, epigastric abdominal pain, nausea, and hematemesis. Endorses chronic daily NSAID use of ASA and ibuprofen.      Full Code. Lora Covington, daughter in [place], and Julieta Covington daugher who lives local.      History reviewed. No pertinent past medical history.    Past Surgical History:   Procedure Laterality Date    APPENDECTOMY       SECTION      TUBAL LIGATION         Review of patient's allergies indicates:  No Known Allergies    No current facility-administered medications on file prior to encounter.     No current outpatient medications on file prior to encounter.     Family History       Problem Relation (Age of Onset)    Asthma Brother    Diabetes Mother, Sister, Brother    Hypertension Mother, Sister, Brother    No Known Problems Father, Daughter, Daughter, Daughter          Tobacco Use    Smoking status: Never Smoker    Smokeless tobacco: Never Used   Substance and Sexual Activity    Alcohol use: Not Currently    Drug use: Not Currently    Sexual activity: Not Currently     Review of Systems   Constitutional:  Positive for activity change, appetite change and fatigue. Negative for chills,  diaphoresis and fever.   HENT:  Negative for postnasal drip, sinus pressure and trouble swallowing.         +dental carries   Eyes:  Negative for visual disturbance.   Respiratory:  Negative for cough, chest tightness, shortness of breath and wheezing.    Cardiovascular:  Positive for palpitations. Negative for chest pain and leg swelling.   Gastrointestinal:  Positive for abdominal pain, blood in stool, nausea and vomiting. Negative for abdominal distention, constipation and diarrhea.        +melena  + hematemesis   Genitourinary:  Negative for difficulty urinating, dysuria, flank pain, hematuria and urgency.   Musculoskeletal:  Positive for myalgias (generalized). Negative for gait problem.   Skin:  Negative for rash and wound.   Neurological:  Positive for dizziness, weakness (generalized), light-headedness and headaches.   Psychiatric/Behavioral:  Negative for behavioral problems, decreased concentration, dysphoric mood and sleep disturbance. The patient is nervous/anxious.    All other systems reviewed and are negative.  Objective:     Vital Signs (Most Recent):  Temp: 97.9 °F (36.6 °C) (02/21/22 1010)  Pulse: 79 (02/21/22 1033)  Resp: 20 (02/21/22 1033)  BP: (!) 158/90 (02/21/22 1033)  SpO2: 100 % (02/21/22 1033)   Vital Signs (24h Range):  Temp:  [97.9 °F (36.6 °C)-98.4 °F (36.9 °C)] 97.9 °F (36.6 °C)  Pulse:  [75-85] 79  Resp:  [10-25] 20  SpO2:  [97 %-100 %] 100 %  BP: (129-184)/() 158/90     Weight: 80.4 kg (177 lb 4 oz)  Body mass index is 29.5 kg/m².    Physical Exam  Vitals and nursing note reviewed.   Constitutional:       General: She is awake. She is not in acute distress.     Appearance: She is obese. She is ill-appearing and toxic-appearing.   HENT:      Head: Normocephalic and atraumatic.      Comments: Pale mucous membranes     Mouth/Throat:      Mouth: Mucous membranes are dry.      Dentition: Abnormal dentition. Dental caries present.      Comments: Poor dentition, several decaying and  missing teeth  Eyes:      Extraocular Movements: Extraocular movements intact.      Conjunctiva/sclera: Conjunctivae normal.   Cardiovascular:      Rate and Rhythm: Regular rhythm. Tachycardia present.      Pulses: Normal pulses.      Heart sounds: Normal heart sounds. No murmur heard.  Pulmonary:      Effort: Pulmonary effort is normal. Tachypnea present. No accessory muscle usage or respiratory distress.      Breath sounds: Normal breath sounds. No wheezing or rales.   Abdominal:      General: Bowel sounds are normal.      Tenderness: There is abdominal tenderness.   Genitourinary:     Comments: deferred  Musculoskeletal:         General: Normal range of motion.      Cervical back: Normal range of motion.   Skin:     General: Skin is cool.      Capillary Refill: Capillary refill takes more than 3 seconds.      Coloration: Skin is ashen and pale.   Neurological:      Mental Status: She is alert and oriented to person, place, and time.   Psychiatric:         Mood and Affect: Mood is anxious. Affect is tearful.         Behavior: Behavior is cooperative.           Significant Labs: All pertinent labs within the past 24 hours have been reviewed.  Recent Lab Results         02/21/22  1031   02/21/22  1003   02/21/22  0931   02/21/22  0921        Unit Blood Type Code       9500  [P]              9500  [P]       Unit Expiration       [id number]  [P]              [id number]  [P]       Unit Blood Type       O NEG  [P]              O NEG  [P]       Albumin       2.8       Alkaline Phosphatase       103       ALT       26       Anion Gap       6       aPTT       22.8  Comment: aPTT therapeutic range = 39-69 seconds       AST       23       Baso # 0.06       0.04       Basophil % 0.5       0.3       BILIRUBIN TOTAL       0.1  Comment: For infants and newborns, interpretation of results should be based  on gestational age, weight and in agreement with clinical  observations.    Premature Infant recommended reference  ranges:  Up to 24 hours.............<8.0 mg/dL  Up to 48 hours............<12.0 mg/dL  3-5 days..................<15.0 mg/dL  6-29 days.................<15.0 mg/dL         BUN       21       Calcium       8.1       Chloride       107       CO2       25       CODING SYSTEM       SMRW450  [P]              [id number]  [P]       Creatinine       0.7       Differential Method Automated       Automated       DISPENSE STATUS       ISSUED  [P]              ISSUED  [P]       eGFR if        >60       eGFR if non        >60  Comment: Calculation used to obtain the estimated glomerular filtration  rate (eGFR) is the CKD-EPI equation.          Eos # 0.1       0.2       Eosinophil % 0.5       1.2       Glucose       145       Gran # (ANC) 10.0       8.9       Gran % 78.7       70.2       Group & Rh       B POS       Hematocrit 23.4       18.1  Comment: Results confirmed, test repeated  HGB/HCT  critical result(s) called and verbal readback obtained from   OMID ESCOBAR RN  by OBDULIA 02/21/2022 09:39         Hemoglobin 7.2       5.5  Comment: Results confirmed, test repeated  HGB/HCT  critical result(s) called and verbal readback obtained from   OMID ESCOBAR RN  by OBDULIA 02/21/2022 09:39         Immature Grans (Abs) 0.21  Comment: Mild elevation in immature granulocytes is non specific and   can be seen in a variety of conditions including stress response,   acute inflammation, trauma and pregnancy. Correlation with other   laboratory and clinical findings is essential.         0.23  Comment: Mild elevation in immature granulocytes is non specific and   can be seen in a variety of conditions including stress response,   acute inflammation, trauma and pregnancy. Correlation with other   laboratory and clinical findings is essential.         Immature Granulocytes 1.7       1.8       INDIRECT DOUGLAS       NEG       INR       0.9  Comment: Coumadin Therapy:  2.0 - 3.0 for INR for all indicators except  mechanical heart valves  and antiphospholipid syndromes which should use 2.5 - 3.5.         Lipase       53       Lymph # 1.6       2.6       Lymph % 12.8       20.8       MCH 27.1       26.7       MCHC 30.8       30.4       MCV 88       88       Mono # 0.7       0.7       Mono % 5.8       5.7       MPV 9.9       9.4       nRBC 2       3       Occult Blood     Positive         Platelet Estimate       Appears normal       Platelets 234       308       Potassium       4.0       Product Code       [id number]  [P]              [id number]  [P]       PROTEIN TOTAL       5.8       Protime       10.5        Acceptable   Yes           RBC 2.66       2.06       RDW 15.7       16.2       SARS-CoV-2 RNA, Amplification, Qual   Negative           Sodium       138       UNIT NUMBER       [id number]  [P]              [id number]  [P]       WBC 12.68       12.67                [P] - Preliminary Result               Significant Imaging: I have reviewed all pertinent imaging results/findings within the past 24 hours.  Imaging Results              X-Ray Abdomen Flat And Erect (In process)  Result time 02/21/22 10:27:21                      Assessment/Plan:     * Hematemesis with nausea  See below      Acute blood loss anemia  R/t GI bleeding- hematemesis, and melena  PPI IV BID   GI plans to scope today  2 units of emergent blood in Emergency Department  Prepare 2 additional and transfuse 1 for now  CBC TID  Transfuse additional units as needed    Recent Labs   Lab 02/21/22  1031   HGB 7.2*   HCT 23.4*   2/21/22 0921   H 5.5   H 18.1    NSAID long-term use  Taking ASA and ibuprofen several times daily for several years  counseled on need for cessation    VTE Risk Mitigation (From admission, onward)         Ordered     Reason for No Pharmacological VTE Prophylaxis  Once        Question:  Reasons:  Answer:  Active Bleeding    02/21/22 1046     IP VTE HIGH RISK PATIENT  Once         02/21/22 1046     Place sequential  compression device  Until discontinued         02/21/22 1046                   Lauren Sage NP  Department of Hospital Medicine   [facility] - Emergency Dept.

## 2022-02-21 NOTE — ASSESSMENT & PLAN NOTE
The patient has coffee ground emesis and melena in the setting of chronic NSAID use. Upper GI bleed suspected.   Agree with blood transfusion and IV Protonix.   Will plan on EGD today or tomorrow depending on resuscitation.   Keep NPO.

## 2022-02-21 NOTE — INTERVAL H&P NOTE
The patient has been examined and the H&P has been reviewed:    I concur with the findings and no changes have occurred since H&P was written.    Anesthesia/Surgery risks, benefits and alternative options discussed and understood by patient/family.          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

## 2022-02-21 NOTE — HPI
The patient presented to the ER for weakness. Symptoms started three days ago. She describes nausea, decreased appetite, black stool and weakness. In the ER, she vomited coffee ground emesis several times. Labs showed a Hgb of 5.5. She is receiving emergent RBCs now. BUN 21 and creatinine 0.7. Vitals stable. She reports taking Ibuprofen three times a day for right leg pain.

## 2022-02-21 NOTE — ASSESSMENT & PLAN NOTE
R/t GI bleeding- hematemesis, and melena  PPI IV BID   GI plans to scope today  2 units of emergent blood in Emergency Department  Prepare 2 additional and transfuse 1 for now  CBC TID  Transfuse additional units as needed    Recent Labs   Lab 02/21/22  1031   HGB 7.2*   HCT 23.4*   2/21/22 0921   H 5.5   H 18.1

## 2022-02-21 NOTE — HOSPITAL COURSE
2/21 - Admitted to ICU.  EGD done upon arrival revealing esophageal ulcer with visible vessel in the background of esophagitis treated with Epi inj.  She received 3 units PRBCs and Hgb up to 8.9.  On exam she is supine in bed fully AA&OX3 in no distress with VSS.

## 2022-02-21 NOTE — SUBJECTIVE & OBJECTIVE
No past medical history on file.    No past surgical history on file.    Review of patient's allergies indicates:  Not on File  Family History    None       Tobacco Use    Smoking status: Not on file    Smokeless tobacco: Not on file   Substance and Sexual Activity    Alcohol use: Not on file    Drug use: Not on file    Sexual activity: Not on file     Review of Systems   Constitutional:  Negative for fever.   HENT:  Negative for hearing loss.    Eyes:  Negative for visual disturbance.   Respiratory:  Positive for shortness of breath. Negative for cough.    Cardiovascular:  Negative for chest pain.   Gastrointestinal:         As per HPI.   Genitourinary:  Negative for dysuria, frequency and hematuria.   Musculoskeletal:  Positive for arthralgias and back pain.   Skin:  Negative for rash.   Neurological:  Positive for weakness. Negative for seizures, syncope, numbness and headaches.   Hematological:  Does not bruise/bleed easily.   Psychiatric/Behavioral:  The patient is not nervous/anxious.    Objective:     Vital Signs (Most Recent):  Temp: 97.9 °F (36.6 °C) (02/21/22 0952)  Pulse: 82 (02/21/22 0952)  Resp: (!) 21 (02/21/22 0952)  BP: (!) 152/81 (02/21/22 0952)  SpO2: 100 % (02/21/22 0952)   Vital Signs (24h Range):  Temp:  [97.9 °F (36.6 °C)-98.4 °F (36.9 °C)] 97.9 °F (36.6 °C)  Pulse:  [77-85] 82  Resp:  [10-21] 21  SpO2:  [97 %-100 %] 100 %  BP: (129-152)/(81-90) 152/81     Weight: 80.4 kg (177 lb 4 oz) (02/21/22 0918)  Body mass index is 29.5 kg/m².      Intake/Output Summary (Last 24 hours) at 2/21/2022 1021  Last data filed at 2/21/2022 1013  Gross per 24 hour   Intake 1991.67 ml   Output --   Net 1991.67 ml       Lines/Drains/Airways       Peripheral Intravenous Line  Duration                  Peripheral IV - Single Lumen 02/21/22 0000 18 G Right Antecubital <1 day         Peripheral IV - Single Lumen 02/21/22 0919 18 G Left Antecubital <1 day                    Physical Exam  Constitutional:        Appearance: Normal appearance. She is well-developed. She is ill-appearing.   HENT:      Head: Normocephalic and atraumatic.      Mouth/Throat:      Dentition: Abnormal dentition.   Eyes:      Extraocular Movements: Extraocular movements intact.   Cardiovascular:      Rate and Rhythm: Normal rate and regular rhythm.      Heart sounds: No murmur heard.  Pulmonary:      Effort: Pulmonary effort is normal. No respiratory distress.      Breath sounds: Normal breath sounds. No wheezing.   Abdominal:      General: Bowel sounds are normal. There is no distension.      Palpations: Abdomen is soft. There is no mass.      Tenderness: There is abdominal tenderness in the epigastric area.   Musculoskeletal:      Cervical back: Normal range of motion and neck supple.      Right lower leg: No edema.      Left lower leg: No edema.   Skin:     General: Skin is warm and dry.      Findings: No rash.   Neurological:      Mental Status: She is alert and oriented to person, place, and time.      Cranial Nerves: No cranial nerve deficit.   Psychiatric:         Behavior: Behavior normal.       Significant Labs:  CBC:   Recent Labs   Lab 02/21/22  0921   WBC 12.67   HGB 5.5*   HCT 18.1*        CMP:   Recent Labs   Lab 02/21/22  0921   *   CALCIUM 8.1*   ALBUMIN 2.8*   PROT 5.8*      K 4.0   CO2 25      BUN 21*   CREATININE 0.7   ALKPHOS 103   ALT 26   AST 23   BILITOT 0.1     Coagulation:   Recent Labs   Lab 02/21/22  0921   INR 0.9   APTT 22.8       Significant Imaging:  Imaging results within the past 24 hours have been reviewed.

## 2022-02-21 NOTE — Clinical Note
Laura accompanied their mother to the emergency department on 2/21/2022. They may return to work on 02/22/2022.      If you have any questions or concerns, please don't hesitate to call.      Sagar FARFAN RN

## 2022-02-21 NOTE — SUBJECTIVE & OBJECTIVE
Past Medical History:   Diagnosis Date    Asthma        Past Surgical History:   Procedure Laterality Date    APPENDECTOMY       SECTION      TUBAL LIGATION         Review of patient's allergies indicates:  No Known Allergies    Family History       Problem Relation (Age of Onset)    Asthma Brother    Diabetes Mother, Sister, Brother    Hypertension Mother, Sister, Brother    No Known Problems Father, Daughter, Daughter, Daughter          Tobacco Use    Smoking status: Never Smoker    Smokeless tobacco: Never Used   Substance and Sexual Activity    Alcohol use: Not Currently    Drug use: Not Currently    Sexual activity: Not Currently         Review of Systems   Constitutional:  Positive for appetite change and fatigue. Negative for chills, diaphoresis and fever.   HENT:  Negative for congestion.    Respiratory:  Negative for apnea, cough, shortness of breath and wheezing.    Cardiovascular:  Negative for chest pain and leg swelling.   Gastrointestinal:  Negative for abdominal pain, diarrhea, nausea and vomiting.   Endocrine: Negative for polydipsia.   Genitourinary:  Negative for difficulty urinating.   Musculoskeletal:  Positive for joint swelling (right hip and leg nerve pain chronic per patient). Negative for myalgias.   Skin:  Negative for color change and wound.   Allergic/Immunologic: Negative for immunocompromised state.   Neurological:  Negative for dizziness, syncope and weakness.   Hematological:  Does not bruise/bleed easily.   Psychiatric/Behavioral:  Negative for agitation, confusion and hallucinations. The patient is not nervous/anxious.    Objective:     Vital Signs (Most Recent):  Temp: 98.2 °F (36.8 °C) (22 1305)  Pulse: 79 (22 1500)  Resp: 16 (22 1445)  BP: (!) 175/98 (22 1430)  SpO2: 100 % (22 1500)   Vital Signs (24h Range):  Temp:  [97.9 °F (36.6 °C)-98.4 °F (36.9 °C)] 98.2 °F (36.8 °C)  Pulse:  [72-85] 79  Resp:  [10-25] 16  SpO2:  [96 %-100 %] 100 %  BP:  (129-184)/() 175/98     Weight: 80.3 kg (177 lb)  Body mass index is 29.45 kg/m².      Intake/Output Summary (Last 24 hours) at 2/21/2022 1550  Last data filed at 2/21/2022 1403  Gross per 24 hour   Intake 2178.26 ml   Output 850 ml   Net 1328.26 ml       Physical Exam  Vitals and nursing note reviewed.   Constitutional:       General: She is awake. She is not in acute distress.     Appearance: She is well-developed. She is obese. She is not ill-appearing, toxic-appearing or diaphoretic.      Interventions: She is not intubated.  HENT:      Head: Normocephalic and atraumatic.      Mouth/Throat:      Mouth: Mucous membranes are moist.   Eyes:      General: Lids are normal.      Pupils: Pupils are equal, round, and reactive to light.   Neck:      Vascular: No carotid bruit.      Trachea: Trachea normal.   Cardiovascular:      Rate and Rhythm: Normal rate and regular rhythm.      Pulses: Normal pulses.           Radial pulses are 2+ on the right side and 2+ on the left side.        Dorsalis pedis pulses are 2+ on the right side and 2+ on the left side.      Heart sounds: Normal heart sounds.   Pulmonary:      Effort: Pulmonary effort is normal. No accessory muscle usage or respiratory distress. She is not intubated.      Breath sounds: Normal breath sounds.   Chest:      Chest wall: No deformity or tenderness.   Abdominal:      General: Bowel sounds are normal. There is no distension.      Palpations: Abdomen is soft.      Tenderness: There is no abdominal tenderness.      Comments: obese   Musculoskeletal:         General: Normal range of motion.      Cervical back: Normal range of motion.      Right lower leg: No edema.      Left lower leg: No edema.      Right foot: No deformity.      Left foot: No deformity.   Lymphadenopathy:      Cervical: No cervical adenopathy.   Skin:     General: Skin is warm and dry.      Capillary Refill: Capillary refill takes less than 2 seconds.      Findings: No rash.    Neurological:      General: No focal deficit present.      Mental Status: She is alert and oriented to person, place, and time.   Psychiatric:         Attention and Perception: Attention normal.         Mood and Affect: Mood normal.         Speech: Speech normal.         Behavior: Behavior normal. Behavior is cooperative.         Thought Content: Thought content normal.         Cognition and Memory: Cognition normal.         Judgment: Judgment normal.       Vents:       Lines/Drains/Airways       Drain  Duration             Female External Urinary Catheter 02/21/22 1059 <1 day              Peripheral Intravenous Line  Duration                  Peripheral IV - Single Lumen 02/21/22 0000 18 G Right Antecubital <1 day         Peripheral IV - Single Lumen 02/21/22 0919 18 G Left Antecubital <1 day                    Significant Labs:    CBC/Anemia Profile:  Recent Labs   Lab 02/21/22  0921 02/21/22  0931 02/21/22  0943 02/21/22  1031 02/21/22  1452   WBC 12.67  --   --  12.68  --    HGB 5.5*  --   --  7.2* 8.9*   HCT 18.1*  --  <15* 23.4* 27.4*     --   --  234  --    MCV 88  --   --  88  --    RDW 16.2*  --   --  15.7*  --    OCCULTBLOOD  --  Positive*  --   --   --         Chemistries:  Recent Labs   Lab 02/21/22  0921      K 4.0      CO2 25   BUN 21*   CREATININE 0.7   CALCIUM 8.1*   ALBUMIN 2.8*   PROT 5.8*   BILITOT 0.1   ALKPHOS 103   ALT 26   AST 23       Urine Studies:   Recent Labs   Lab 02/21/22  1142   COLORU Yellow   APPEARANCEUA Clear   PHUR 7.0   SPECGRAV 1.010   PROTEINUA Negative   GLUCUA Negative   KETONESU Negative   BILIRUBINUA Negative   OCCULTUA Negative   NITRITE Negative   UROBILINOGEN Negative   LEUKOCYTESUR Negative     All pertinent labs within the past 24 hours have been reviewed.    Significant Imaging:   I have reviewed all pertinent imaging results/findings within the past 24 hours.  Abd film unremarkable for acute process

## 2022-02-21 NOTE — CONSULTS
[facility] - Intensive [facility] (Hospital)  Critical Care Medicine  Consult Note    Patient Name: Kristen Gates  MRN: [id number]  Admission Date: 2022  Hospital Length of Stay: 0 days  Code Status: Full Code  Attending Physician: Wilbert Oliva, *   Primary Care Provider: Primary Doctor No   Principal Problem: Acute blood loss anemia    [unfilled]  Subjective:     HPI:  Ms Gates is a 50 yo female with a PMH of Asthma only and only home meds consist of daily Advil use and PRN Albuterol MDI.  She presented to [facility] ED this AM about 0900 hr via EMS due to malaise progressive over 3 days with associated melena and N/V.  She admits to frequent NSAID use daily for chronic RLE pain.  Had episode hematemesis in ED with epigastric abd pain.  In ED Hgb 5.5.  GI consulted and ordered 2 units PRBCs and started on PPI.  Plan admit to ICU and EGD in ICU asap.  Of note, UDS + Amphetamines not listed on home meds.       Hospital/ICU Course:   - Admitted to ICU.  EGD done upon arrival revealing esophageal ulcer with visible vessel in the background of esophagitis treated with Epi inj.  She received 3 units PRBCs and Hgb up to 8.9.  On exam she is supine in bed fully AA&OX3 in no distress with VSS.       Past Medical History:   Diagnosis Date    Asthma        Past Surgical History:   Procedure Laterality Date    APPENDECTOMY       SECTION      TUBAL LIGATION         Review of patient's allergies indicates:  No Known Allergies    Family History       Problem Relation (Age of Onset)    Asthma Brother    Diabetes Mother, Sister, Brother    Hypertension Mother, Sister, Brother    No Known Problems Father, Daughter, Daughter, Daughter          Tobacco Use    Smoking status: Never Smoker    Smokeless tobacco: Never Used   Substance and Sexual Activity    Alcohol use: Not Currently    Drug use: Not Currently    Sexual activity: Not Currently         Review of Systems   Constitutional:  Positive for  appetite change and fatigue. Negative for chills, diaphoresis and fever.   HENT:  Negative for congestion.    Respiratory:  Negative for apnea, cough, shortness of breath and wheezing.    Cardiovascular:  Negative for chest pain and leg swelling.   Gastrointestinal:  Negative for abdominal pain, diarrhea, nausea and vomiting.   Endocrine: Negative for polydipsia.   Genitourinary:  Negative for difficulty urinating.   Musculoskeletal:  Positive for joint swelling (right hip and leg nerve pain chronic per patient). Negative for myalgias.   Skin:  Negative for color change and wound.   Allergic/Immunologic: Negative for immunocompromised state.   Neurological:  Negative for dizziness, syncope and weakness.   Hematological:  Does not bruise/bleed easily.   Psychiatric/Behavioral:  Negative for agitation, confusion and hallucinations. The patient is not nervous/anxious.    Objective:     Vital Signs (Most Recent):  Temp: 98.2 °F (36.8 °C) (02/21/22 1305)  Pulse: 79 (02/21/22 1500)  Resp: 16 (02/21/22 1445)  BP: (!) 175/98 (02/21/22 1430)  SpO2: 100 % (02/21/22 1500)   Vital Signs (24h Range):  Temp:  [97.9 °F (36.6 °C)-98.4 °F (36.9 °C)] 98.2 °F (36.8 °C)  Pulse:  [72-85] 79  Resp:  [10-25] 16  SpO2:  [96 %-100 %] 100 %  BP: (129-184)/() 175/98     Weight: 80.3 kg (177 lb)  Body mass index is 29.45 kg/m².      Intake/Output Summary (Last 24 hours) at 2/21/2022 1550  Last data filed at 2/21/2022 1403  Gross per 24 hour   Intake 2178.26 ml   Output 850 ml   Net 1328.26 ml       Physical Exam  Vitals and nursing note reviewed.   Constitutional:       General: She is awake. She is not in acute distress.     Appearance: She is well-developed. She is obese. She is not ill-appearing, toxic-appearing or diaphoretic.      Interventions: She is not intubated.  HENT:      Head: Normocephalic and atraumatic.      Mouth/Throat:      Mouth: Mucous membranes are moist.   Eyes:      General: Lids are normal.      Pupils: Pupils  are equal, round, and reactive to light.   Neck:      Vascular: No carotid bruit.      Trachea: Trachea normal.   Cardiovascular:      Rate and Rhythm: Normal rate and regular rhythm.      Pulses: Normal pulses.           Radial pulses are 2+ on the right side and 2+ on the left side.        Dorsalis pedis pulses are 2+ on the right side and 2+ on the left side.      Heart sounds: Normal heart sounds.   Pulmonary:      Effort: Pulmonary effort is normal. No accessory muscle usage or respiratory distress. She is not intubated.      Breath sounds: Normal breath sounds.   Chest:      Chest wall: No deformity or tenderness.   Abdominal:      General: Bowel sounds are normal. There is no distension.      Palpations: Abdomen is soft.      Tenderness: There is no abdominal tenderness.      Comments: obese   Musculoskeletal:         General: Normal range of motion.      Cervical back: Normal range of motion.      Right lower leg: No edema.      Left lower leg: No edema.      Right foot: No deformity.      Left foot: No deformity.   Lymphadenopathy:      Cervical: No cervical adenopathy.   Skin:     General: Skin is warm and dry.      Capillary Refill: Capillary refill takes less than 2 seconds.      Findings: No rash.   Neurological:      General: No focal deficit present.      Mental Status: She is alert and oriented to person, place, and time.   Psychiatric:         Attention and Perception: Attention normal.         Mood and Affect: Mood normal.         Speech: Speech normal.         Behavior: Behavior normal. Behavior is cooperative.         Thought Content: Thought content normal.         Cognition and Memory: Cognition normal.         Judgment: Judgment normal.       Vents:       Lines/Drains/Airways       Drain  Duration             Female External Urinary Catheter 02/21/22 1059 <1 day              Peripheral Intravenous Line  Duration                  Peripheral IV - Single Lumen 02/21/22 0000 18 G Right Antecubital  <1 day         Peripheral IV - Single Lumen 02/21/22 0919 18 G Left Antecubital <1 day                    Significant Labs:    CBC/Anemia Profile:  Recent Labs   Lab 02/21/22  0921 02/21/22  0931 02/21/22  0943 02/21/22  1031 02/21/22  1452   WBC 12.67  --   --  12.68  --    HGB 5.5*  --   --  7.2* 8.9*   HCT 18.1*  --  <15* 23.4* 27.4*     --   --  234  --    MCV 88  --   --  88  --    RDW 16.2*  --   --  15.7*  --    OCCULTBLOOD  --  Positive*  --   --   --         Chemistries:  Recent Labs   Lab 02/21/22  0921      K 4.0      CO2 25   BUN 21*   CREATININE 0.7   CALCIUM 8.1*   ALBUMIN 2.8*   PROT 5.8*   BILITOT 0.1   ALKPHOS 103   ALT 26   AST 23       Urine Studies:   Recent Labs   Lab 02/21/22  1142   COLORU Yellow   APPEARANCEUA Clear   PHUR 7.0   SPECGRAV 1.010   PROTEINUA Negative   GLUCUA Negative   KETONESU Negative   BILIRUBINUA Negative   OCCULTUA Negative   NITRITE Negative   UROBILINOGEN Negative   LEUKOCYTESUR Negative     All pertinent labs within the past 24 hours have been reviewed.    Significant Imaging:   I have reviewed all pertinent imaging results/findings within the past 24 hours.  Abd film unremarkable for acute process      ABG  Recent Labs   Lab 02/21/22  0943   PH 7.372   PO2 91   PCO2 40.5   HCO3 23.5*   BE -2     Assessment/Plan:     Psychiatric  Amphetamine abuse  Patient denies    Cardiac/Vascular  Essential hypertension  Add Norvasc  PRN Hydralazine    Oncology  * Acute blood loss anemia  Transfused 3 units PRBCs and Hgb up from 5.5 to 8.9  Follow up H/H  Transfuse for Hgb > 7.5  Asymptomatic     GI  Ulcer of esophagus with bleeding  S/P EGD revealing esophageal ulcer with visible vessel in the background of esophagitis treated with Epi inj  No further bleeding, N/V and abd pain  Cont PPI BID and Carafate  Clear liquids overnight  Daily NSAID use encouraged cessation  Cleared for transfer out of ICU per GI    Other  NSAID long-term use  Encouraged cessation          Preventive Measures and Monitoring:   Stress Ulcer: PPI and Carafate  Nutrition: Clear Liquid diet  Glucose control: no indication for SSI  Bowel prophylaxis: having melena stools  DVT prophylaxis: SCDs  Hx CAD on B-Blocker: no hx CAD  Head of Bed/Reposition: Elevate HOB and turn Q1-2 hours   Early Mobility: OOB as tolerated  Code Status: Full    Counseling/Consultation:I have discussed the care of this patient in detail with the bedside nursing staff and Dr. Solomon and Dr. Babcock and Dr. Carroll    Critical Care Time: 45 minutes  Critical secondary to Patient has a condition that poses threat to life and bodily function: ABLA secondary to GI bleeding     Critical care was time spent personally by me on the following activities: development of treatment plan with patient or surrogate and bedside caregivers, discussions with consultants, evaluation of patient's response to treatment, examination of patient, ordering and performing treatments and interventions, ordering and review of laboratory studies, ordering and review of radiographic studies, pulse oximetry, re-evaluation of patient's condition. This critical care time did not overlap with that of any other provider or involve time for any procedures.    Thank you for your consult. I will sign off. Please contact us if you have any additional questions.  Will transfer out of ICU with GI in agreement     Cole Ching NP  Critical Care Medicine  [facility] - Intensive Care ([facility])

## 2022-02-21 NOTE — PHARMACY MED REC
"Admission Medication History     The home medication history was taken by David Troy.    You may go to "Admission" then "Reconcile Home Medications" tabs to review and/or act upon these items.      The home medication list has been updated by the Pharmacy department.    Please read ALL comments highlighted in yellow.    Please address this information as you see fit.     Feel free to contact us if you have any questions or require assistance.      David Troy  [id number]    No current outpatient medications on file prior to encounter.                             .          "

## 2022-02-21 NOTE — HPI
Kristen Gates is a 51 y.o. female who presented to the Emergency Department today with c/o generalized weakness for the last 3 days days. It has been constant and of moderate severity.  Aggravating factors include none. Alleviating factors include none. Associated symptoms include fatigue, melena, epigastric abdominal pain, nausea, and hematemesis. Endorses chronic daily NSAID use of ASA and ibuprofen.      Full Code. Lora Covington, daughter in [place], and karthik Sifuentes who lives local.

## 2022-02-21 NOTE — HPI
Ms Gates is a 52 yo female with a PMH of Asthma only and only home meds consist of daily Advil use and PRN Albuterol MDI.  She presented to [facility] ED this AM about 0900 hr via EMS due to malaise progressive over 3 days with associated melena and N/V.  She admits to frequent NSAID use daily for chronic RLE pain.  Had episode hematemesis in ED with epigastric abd pain.  In ED Hgb 5.5.  GI consulted and ordered 2 units PRBCs and started on PPI.  Plan admit to ICU and EGD in ICU asap.  Of note, UDS + Amphetamines not listed on home meds.

## 2022-02-21 NOTE — PLAN OF CARE
Patient VSS aside from elevated Bps. MD aware, medications ordered and given. EGD done at bedside, patient tolerated well. Patient received 3u PRBCs. CBCs trended as well. Daughter at bedside and involved in Poc.     Problem: Adult Inpatient Plan of Care  Goal: Plan of Care Review  Outcome: Ongoing, Progressing  Goal: Patient-Specific Goal (Individualized)  Outcome: Ongoing, Progressing  Goal: Absence of Hospital-Acquired Illness or Injury  Outcome: Ongoing, Progressing  Goal: Optimal Comfort and Wellbeing  Outcome: Ongoing, Progressing  Goal: Readiness for Transition of Care  Outcome: Ongoing, Progressing

## 2022-02-21 NOTE — ASSESSMENT & PLAN NOTE
Transfused 3 units PRBCs and Hgb up from 5.5 to 8.9  Follow up H/H  Transfuse for Hgb > 7.5  Asymptomatic

## 2022-02-21 NOTE — ED PROVIDER NOTES
SCRIBE #1 NOTE: I, Adria Keys, am scribing for, and in the presence of, Ирина Carlson MD. I have scribed the entire note.      History      Chief Complaint   Patient presents with    Fatigue     Weakness x 3 days       Review of patient's allergies indicates:  No Known Allergies     HPI   HPI    2022, 9:11 AM   History obtained from the patient      History of Present Illness: Kristen Gates is a 51 y.o. female patient who presents to the Emergency Department for generalized weakness, onset 3 days PTA. Symptoms are constant and moderate in severity. No mitigating or exacerbating factors reported. Associated sxs include fatigue, black stools, epigastric abdominal pain, nausea, hematemesis; pt did not start vomiting until after her arrival to the ED. Patient denies any fever, chills, SOB, CP, numbness, dizziness, headache, and all other sxs at this time. Pt reports chronic NSAID use. No further complaints or concerns at this time.     Arrival mode: EMS    PCP: Primary Doctor No       Past Medical History:  History reviewed. No pertinent past medical history.    Past Surgical History:  Past Surgical History:   Procedure Laterality Date    APPENDECTOMY       SECTION      TUBAL LIGATION           Family History:  Family History   Problem Relation Age of Onset    Hypertension Mother     Diabetes Mother     No Known Problems Father     Hypertension Sister     Diabetes Sister     Asthma Brother     Hypertension Brother     Diabetes Brother     No Known Problems Daughter     No Known Problems Daughter     No Known Problems Daughter        Social History:  Social History     Tobacco Use    Smoking status: Never Smoker    Smokeless tobacco: Never Used   Substance and Sexual Activity    Alcohol use: Not Currently    Drug use: Not Currently    Sexual activity: Not Currently       ROS   Review of Systems   Constitutional: Positive for fatigue. Negative for chills and fever.   HENT: Negative  for sore throat.    Respiratory: Negative for shortness of breath.    Cardiovascular: Negative for chest pain.   Gastrointestinal: Positive for abdominal pain (epigastric), blood in stool (black stools), nausea and vomiting (hematemesis). Negative for diarrhea.   Genitourinary: Negative for dysuria.   Musculoskeletal: Negative for back pain.   Skin: Negative for rash.   Neurological: Positive for weakness (generalized). Negative for dizziness, light-headedness, numbness and headaches.   Hematological: Does not bruise/bleed easily.   All other systems reviewed and are negative.    Physical Exam      Initial Vitals   BP Pulse Resp Temp SpO2   02/21/22 0846 02/21/22 0846 02/21/22 0846 02/21/22 0851 02/21/22 0846   (!) 144/90 77 18 98.4 °F (36.9 °C) 97 %      MAP       --                 Physical Exam  Nursing Notes and Vital Signs Reviewed.  Constitutional: Patient is in mild distress. Dry, coffee-ground emesis around mouth and on tongue.  Head: Atraumatic. Normocephalic.  Eyes: PERRL. EOM intact. Conjunctivae are  pale. No scleral icterus.  ENT: Mucous membranes are dry and pale. Oropharynx is clear and symmetric.    Neck: Supple. Full ROM.  Cardiovascular: Regular rate. Regular rhythm. No murmurs, rubs, or gallops. Distal pulses are 2+ and symmetric.  Pulmonary/Chest: No respiratory distress. Clear to auscultation bilaterally. No wheezing or rales.  Abdominal: Soft and non-distended.  There is epigastric tenderness.  No rebound, guarding, or rigidity.   Musculoskeletal: Moves all extremities. No obvious deformities. No edema.  Skin: Warm and dry. Pale.  Neurological:  Alert, awake, and appropriate.  Normal speech.  No acute focal neurological deficits are appreciated.  Psychiatric: Normal affect. Good eye contact. Appropriate in content.    ED Course    Critical Care    Date/Time: 2/21/2022 10:25 AM  Performed by: Ирина Carlson MD  Authorized by: Ирина Carlson MD   Direct patient critical care time: 25  minutes  Additional history critical care time: 5 minutes  Ordering / reviewing critical care time: 15 minutes  Documentation critical care time: 10 minutes  Consulting other physicians critical care time: 5 minutes  Total critical care time (exclusive of procedural time) : 60 minutes  Critical care time was exclusive of separately billable procedures and treating other patients and teaching time.  Critical care was necessary to treat or prevent imminent or life-threatening deterioration of the following conditions: Upper GI bleed, symptomatic anemia.  Critical care was time spent personally by me on the following activities: blood draw for specimens, development of treatment plan with patient or surrogate, discussions with consultants, interpretation of cardiac output measurements, evaluation of patient's response to treatment, examination of patient, obtaining history from patient or surrogate, ordering and performing treatments and interventions, ordering and review of laboratory studies, ordering and review of radiographic studies, pulse oximetry, re-evaluation of patient's condition and review of old charts.        ED Vital Signs:  Vitals:    02/21/22 1020 02/21/22 1022 02/21/22 1024 02/21/22 1033   BP: (!) 176/90 (!) 184/100 (!) 176/91 (!) 158/90   Pulse: 80 80 75 79   Resp: (!) 25 17 13 20   Temp:       TempSrc:       SpO2: 100% 100% 100% 100%   Weight:       Height:        02/21/22 1055 02/21/22 1057 02/21/22 1058 02/21/22 1115   BP: (!) 146/91 135/77 (!) 155/87 135/76   Pulse: 73 76 79 74   Resp: 18 20 19 18   Temp:    97.9 °F (36.6 °C)   TempSrc:    Oral   SpO2:    100%   Weight:       Height:        02/21/22 1122 02/21/22 1132 02/21/22 1138 02/21/22 1147   BP: 139/74 136/75 (!) 160/85 (!) 140/75   Pulse: 75 75 77 74   Resp: 19 (!) 21 19 19   Temp: 98 °F (36.7 °C)  97.9 °F (36.6 °C)    TempSrc: Oral  Oral    SpO2: 100% 100% 100% 100%   Weight:       Height:        02/21/22 1222 02/21/22 1232 02/21/22 1250    BP: (!) 146/93 (!) 153/97 (!) 164/99   Pulse: 75 73 75   Resp: 18 17 17   Temp: 98.1 °F (36.7 °C)  98.1 °F (36.7 °C)   TempSrc: Oral  Oral   SpO2: 100% 100% 100%   Weight:      Height:          Abnormal Lab Results:  Labs Reviewed   CBC W/ AUTO DIFFERENTIAL - Abnormal; Notable for the following components:       Result Value    RBC 2.06 (*)     Hemoglobin 5.5 (*)     Hematocrit 18.1 (*)     MCH 26.7 (*)     MCHC 30.4 (*)     RDW 16.2 (*)     Immature Granulocytes 1.8 (*)     Gran # (ANC) 8.9 (*)     Immature Grans (Abs) 0.23 (*)     nRBC 3 (*)     All other components within normal limits    Narrative:      HGB/HCT  critical result(s) called and verbal readback obtained from   OMID ESCOBAR RN  by OBDULIA 02/21/2022 09:39   COMPREHENSIVE METABOLIC PANEL - Abnormal; Notable for the following components:    Glucose 145 (*)     BUN 21 (*)     Calcium 8.1 (*)     Total Protein 5.8 (*)     Albumin 2.8 (*)     Anion Gap 6 (*)     All other components within normal limits   OCCULT BLOOD, OTHER SOURCES - Abnormal; Notable for the following components:    Occult Blood Positive (*)     All other components within normal limits   CBC W/ AUTO DIFFERENTIAL - Abnormal; Notable for the following components:    RBC 2.66 (*)     Hemoglobin 7.2 (*)     Hematocrit 23.4 (*)     MCHC 30.8 (*)     RDW 15.7 (*)     Immature Granulocytes 1.7 (*)     Gran # (ANC) 10.0 (*)     Immature Grans (Abs) 0.21 (*)     nRBC 2 (*)     Gran % 78.7 (*)     Lymph % 12.8 (*)     All other components within normal limits   DRUG SCREEN PANEL, URINE EMERGENCY - Abnormal; Notable for the following components:    Amphetamine Screen, Ur Presumptive Positive (*)     All other components within normal limits    Narrative:     Specimen Source->Urine   LIPASE   PROTIME-INR   APTT   URINALYSIS, REFLEX TO URINE CULTURE    Narrative:     Specimen Source->Urine   HEMOGLOBIN A1C   HEMOGLOBIN A1C   SARS-COV-2 RDRP GENE    Narrative:     This test utilizes isothermal  nucleic acid amplification   technology to detect the SARS-CoV-2 RdRp nucleic acid segment.   The analytical sensitivity (limit of detection) is 125 genome   equivalents/mL.   A POSITIVE result implies infection with the SARS-CoV-2 virus;   the patient is presumed to be contagious.     A NEGATIVE result means that SARS-CoV-2 nucleic acids are not   present above the limit of detection. A NEGATIVE result should be   treated as presumptive. It does not rule out the possibility of   COVID-19 and should not be the sole basis for treatment decisions.   If COVID-19 is strongly suspected based on clinical and exposure   history, re-testing using an alternate molecular assay should be   considered.   This test is only for use under the Food and Drug   Administration s Emergency Use Authorization (EUA).   Commercial kits are provided by [employer].   Performance characteristics of the EUA have been independently   verified by [facility] Department of   Pathology and Laboratory Medicine.   _________________________________________________________________   The authorized Fact Sheet for Healthcare Providers and the authorized Fact   Sheet for Patients of the ID NOW COVID-19 are available on the FDA   website:     https://www.fda.gov/media/598158/download  https://www.fda.gov/media/426109/download           TYPE & SCREEN   PREPARE RBC SOFT   PREPARE RBC SOFT        All Lab Results:  Results for orders placed or performed during the hospital encounter of 02/21/22   CBC auto differential   Result Value Ref Range    WBC 12.67 3.90 - 12.70 K/uL    RBC 2.06 (L) 4.00 - 5.40 M/uL    Hemoglobin 5.5 (LL) 12.0 - 16.0 g/dL    Hematocrit 18.1 (LL) 37.0 - 48.5 %    MCV 88 82 - 98 fL    MCH 26.7 (L) 27.0 - 31.0 pg    MCHC 30.4 (L) 32.0 - 36.0 g/dL    RDW 16.2 (H) 11.5 - 14.5 %    Platelets 308 150 - 450 K/uL    MPV 9.4 9.2 - 12.9 fL    Immature Granulocytes 1.8 (H) 0.0 - 0.5 %    Gran # (ANC) 8.9 (H) 1.8 - 7.7 K/uL     Immature Grans (Abs) 0.23 (H) 0.00 - 0.04 K/uL    Lymph # 2.6 1.0 - 4.8 K/uL    Mono # 0.7 0.3 - 1.0 K/uL    Eos # 0.2 0.0 - 0.5 K/uL    Baso # 0.04 0.00 - 0.20 K/uL    nRBC 3 (A) 0 /100 WBC    Gran % 70.2 38.0 - 73.0 %    Lymph % 20.8 18.0 - 48.0 %    Mono % 5.7 4.0 - 15.0 %    Eosinophil % 1.2 0.0 - 8.0 %    Basophil % 0.3 0.0 - 1.9 %    Platelet Estimate Appears normal     Differential Method Automated    Comprehensive metabolic panel   Result Value Ref Range    Sodium 138 136 - 145 mmol/L    Potassium 4.0 3.5 - 5.1 mmol/L    Chloride 107 95 - 110 mmol/L    CO2 25 23 - 29 mmol/L    Glucose 145 (H) 70 - 110 mg/dL    BUN 21 (H) 6 - 20 mg/dL    Creatinine 0.7 0.5 - 1.4 mg/dL    Calcium 8.1 (L) 8.7 - 10.5 mg/dL    Total Protein 5.8 (L) 6.0 - 8.4 g/dL    Albumin 2.8 (L) 3.5 - 5.2 g/dL    Total Bilirubin 0.1 0.1 - 1.0 mg/dL    Alkaline Phosphatase 103 55 - 135 U/L    AST 23 10 - 40 U/L    ALT 26 10 - 44 U/L    Anion Gap 6 (L) 8 - 16 mmol/L    eGFR if African American >60 >60 mL/min/1.73 m^2    eGFR if non African American >60 >60 mL/min/1.73 m^2   Lipase   Result Value Ref Range    Lipase 53 4 - 60 U/L   Protime-INR   Result Value Ref Range    Prothrombin Time 10.5 9.0 - 12.5 sec    INR 0.9 0.8 - 1.2   APTT   Result Value Ref Range    aPTT 22.8 21.0 - 32.0 sec   Occult blood, other sources   Result Value Ref Range    Occult Blood Positive (A) Negative   CBC auto differential   Result Value Ref Range    WBC 12.68 3.90 - 12.70 K/uL    RBC 2.66 (L) 4.00 - 5.40 M/uL    Hemoglobin 7.2 (L) 12.0 - 16.0 g/dL    Hematocrit 23.4 (L) 37.0 - 48.5 %    MCV 88 82 - 98 fL    MCH 27.1 27.0 - 31.0 pg    MCHC 30.8 (L) 32.0 - 36.0 g/dL    RDW 15.7 (H) 11.5 - 14.5 %    Platelets 234 150 - 450 K/uL    MPV 9.9 9.2 - 12.9 fL    Immature Granulocytes 1.7 (H) 0.0 - 0.5 %    Gran # (ANC) 10.0 (H) 1.8 - 7.7 K/uL    Immature Grans (Abs) 0.21 (H) 0.00 - 0.04 K/uL    Lymph # 1.6 1.0 - 4.8 K/uL    Mono # 0.7 0.3 - 1.0 K/uL    Eos # 0.1 0.0 - 0.5  K/uL    Baso # 0.06 0.00 - 0.20 K/uL    nRBC 2 (A) 0 /100 WBC    Gran % 78.7 (H) 38.0 - 73.0 %    Lymph % 12.8 (L) 18.0 - 48.0 %    Mono % 5.8 4.0 - 15.0 %    Eosinophil % 0.5 0.0 - 8.0 %    Basophil % 0.5 0.0 - 1.9 %    Differential Method Automated    Drug screen panel, in-house   Result Value Ref Range    Benzodiazepines Negative Negative    Methadone metabolites Negative Negative    Cocaine (Metab.) Negative Negative    Opiate Scrn, Ur Negative Negative    Barbiturate Screen, Ur Negative Negative    Amphetamine Screen, Ur Presumptive Positive (A) Negative    THC Negative Negative    Phencyclidine Negative Negative    Creatinine, Urine 30.5 15.0 - 325.0 mg/dL    Toxicology Information SEE COMMENT    Urinalysis, Reflex to Urine Culture Urine, Clean Catch    Specimen: Urine   Result Value Ref Range    Specimen UA Urine, Clean Catch     Color, UA Yellow Yellow, Straw, Raquel    Appearance, UA Clear Clear    pH, UA 7.0 5.0 - 8.0    Specific Gravity, UA 1.010 1.005 - 1.030    Protein, UA Negative Negative    Glucose, UA Negative Negative    Ketones, UA Negative Negative    Bilirubin (UA) Negative Negative    Occult Blood UA Negative Negative    Nitrite, UA Negative Negative    Urobilinogen, UA Negative <2.0 EU/dL    Leukocytes, UA Negative Negative   POCT COVID-19 Rapid Screening   Result Value Ref Range    POC Rapid COVID Negative Negative     Acceptable Yes    Type & Screen   Result Value Ref Range    Group & Rh B POS     Indirect [place] NEG    Prepare RBC 2 Units; Emergency   Result Value Ref Range    UNIT NUMBER [id number]     Product Code M5075O09     DISPENSE STATUS ISSUED     CODING SYSTEM [id number]     Unit Blood Type Code 9500     Unit Blood Type O NEG     Unit Expiration [id number]     UNIT NUMBER [id number]     Product Code Y9031L88     DISPENSE STATUS ISSUED     CODING SYSTEM HLEL835     Unit Blood Type Code 9500     Unit Blood Type O NEG     Unit Expiration [id number]    Prepare RBC  2 Units; GI Bleeding   Result Value Ref Range    UNIT NUMBER [id number]     Product Code P4331W50     DISPENSE STATUS CROSSMATCHED     CODING SYSTEM QRWO434     Unit Blood Type Code 7300     Unit Blood Type B POS     Unit Expiration [id number]     UNIT NUMBER [id number]     Product Code J4481H44     DISPENSE STATUS ISSUED     CODING SYSTEM NMOO633     Unit Blood Type Code 7300     Unit Blood Type B POS     Unit Expiration [id number]      Imaging Results:  Imaging Results          X-Ray Abdomen Flat And Erect (Final result)  Result time 02/21/22 11:15:54    Final result by Buzz Barros Jr., MD (02/21/22 11:15:54)                 Impression:      No acute findings.      Electronically signed by: Buzz Barros Jr., MD  Date:    02/21/2022  Time:    11:15             Narrative:    EXAMINATION:  XR ABDOMEN FLAT AND ERECT    CLINICAL HISTORY:  Epigastric pain    COMPARISON:  None.    FINDINGS:  Prior cholecystectomy.  Surgical clip within the right abdomen.  No abnormally dilated loops of bowel. No gross free air. Normal amount of stool. No abnormal soft tissue calcifications.  Distended urinary bladder.  Regional bones are unremarkable.                               The EKG was ordered, reviewed, and independently interpreted by the ED provider.  Interpretation time: 08:41  Rate: 84 BPM  Rhythm: normal sinus rhythm  Interpretation: Nonspecific ST and T wave abnormality. No STEMI.         The Emergency Provider reviewed the vital signs and test results, which are outlined above.    ED Discussion     9:15 AM: Pt has had 2 episodes of coffee-ground emesis since arriving to the ED.    9:44 AM: Discussed pt's case with Dr. Babcock (Gastroenterology) who recommends transfusing at least 2 units of blood.    10:24 AM: Discussed case with Fatimah Quiroz NP (Hospital Medicine). Dr. Carroll agrees with current care and management of pt and accepts admission.   Admitting Service: Hospital Medicine  Admitting Physician:  Dr. Carroll  Admit to: ICU    10:25 AM: Re-evaluated pt. I have discussed test results, shared treatment plan, and the need for admission with patient and family at bedside. Pt and family express understanding at this time and agree with all information. All questions answered. Pt and family have no further questions or concerns at this time. Pt is ready for admit.         ED Medication(s):  Medications   0.9%  NaCl infusion (for blood administration) (has no administration in time range)   pantoprazole injection 40 mg (has no administration in time range)   0.9%  NaCl infusion ( Intravenous New Bag 2/21/22 1133)   pantoprazole injection 80 mg (80 mg Intravenous Given 2/21/22 0922)   sodium chloride 0.9% bolus 2,000 mL (0 mLs Intravenous Stopped 2/21/22 1013)   promethazine (PHENERGAN) 25 mg in dextrose 5 % 50 mL IVPB (0 mg Intravenous Stopped 2/21/22 0935)          New Prescriptions    No medications on file         Medical Decision Making    Medical Decision Making:   Clinical Tests:   Lab Tests: Ordered and Reviewed  Radiological Study: Ordered and Reviewed  Medical Tests: Ordered and Reviewed           Scribe Attestation:   Scribe #1: I performed the above scribed service and the documentation accurately describes the services I performed. I attest to the accuracy of the note.    Attending:   Physician Attestation Statement for Scribe #1: I, Ирина Carlson MD, personally performed the services described in this documentation, as scribed by Adria Keys, in my presence, and it is both accurate and complete.          Clinical Impression       ICD-10-CM ICD-9-CM   1. Acute upper GI bleed  K92.2 578.9   2. GI bleed due to NSAIDs  K92.2 578.9    T39.395A E935.8   3. Epigastric pain  R10.13 789.06   4. Hematemesis with nausea  K92.0 578.0     787.02   5. Symptomatic anemia  D64.9 285.9       Disposition:   Disposition: Admitted  Condition: Critical         Ирина Carlson MD  02/21/22 4425

## 2022-02-21 NOTE — ED NOTES
"Pt is a chronic NSAID (ibuprophen) user, pt reports ~3x a day. Pt states that she has been having what she describes as black tar colored stool for 3 days with adb discomfort. Pt reports "extreme" weakness and fatigue as well. Per previous nurse pt had 3 episodes of coffee ground emesis upon arrival. Pt was given zofran pta by ems.  "

## 2022-02-21 NOTE — H&P (VIEW-ONLY)
[facility] - Emergency Dept.  Gastroenterology  Consult Note    Patient Name: Kristen Gates  MRN: [id number]  Admission Date: 2/21/2022  Hospital Length of Stay: 0 days  Code Status: No Order   Attending Provider: Ирина Carlson MD   Consulting Provider: Ottoniel Jaramillo PA-C  Primary Care Physician: No primary care provider on file.  Principal Problem:Hematemesis with nausea    Inpatient consult to Gastroenterology  Consult performed by: Ottoniel Jaramillo PA-C  Consult ordered by: Ирина Carlson MD  Reason for consult: Upper GI bleed        Subjective:     HPI:  The patient presented to the ER for weakness. Symptoms started three days ago. She describes nausea, decreased appetite, black stool and weakness. In the ER, she vomited coffee ground emesis several times. Labs showed a Hgb of 5.5. She is receiving emergent RBCs now. BUN 21 and creatinine 0.7. Vitals stable. She reports taking Ibuprofen three times a day for right leg pain.       No past medical history on file.    No past surgical history on file.    Review of patient's allergies indicates:  Not on File  Family History    None       Tobacco Use    Smoking status: Not on file    Smokeless tobacco: Not on file   Substance and Sexual Activity    Alcohol use: Not on file    Drug use: Not on file    Sexual activity: Not on file     Review of Systems   Constitutional:  Negative for fever.   HENT:  Negative for hearing loss.    Eyes:  Negative for visual disturbance.   Respiratory:  Positive for shortness of breath. Negative for cough.    Cardiovascular:  Negative for chest pain.   Gastrointestinal:         As per HPI.   Genitourinary:  Negative for dysuria, frequency and hematuria.   Musculoskeletal:  Positive for arthralgias and back pain.   Skin:  Negative for rash.   Neurological:  Positive for weakness. Negative for seizures, syncope, numbness and headaches.   Hematological:  Does not bruise/bleed easily.   Psychiatric/Behavioral:  The patient is not  nervous/anxious.    Objective:     Vital Signs (Most Recent):  Temp: 97.9 °F (36.6 °C) (02/21/22 0952)  Pulse: 82 (02/21/22 0952)  Resp: (!) 21 (02/21/22 0952)  BP: (!) 152/81 (02/21/22 0952)  SpO2: 100 % (02/21/22 0952)   Vital Signs (24h Range):  Temp:  [97.9 °F (36.6 °C)-98.4 °F (36.9 °C)] 97.9 °F (36.6 °C)  Pulse:  [77-85] 82  Resp:  [10-21] 21  SpO2:  [97 %-100 %] 100 %  BP: (129-152)/(81-90) 152/81     Weight: 80.4 kg (177 lb 4 oz) (02/21/22 0918)  Body mass index is 29.5 kg/m².      Intake/Output Summary (Last 24 hours) at 2/21/2022 1021  Last data filed at 2/21/2022 1013  Gross per 24 hour   Intake 1991.67 ml   Output --   Net 1991.67 ml       Lines/Drains/Airways       Peripheral Intravenous Line  Duration                  Peripheral IV - Single Lumen 02/21/22 0000 18 G Right Antecubital <1 day         Peripheral IV - Single Lumen 02/21/22 0919 18 G Left Antecubital <1 day                    Physical Exam  Constitutional:       Appearance: Normal appearance. She is well-developed. She is ill-appearing.   HENT:      Head: Normocephalic and atraumatic.      Mouth/Throat:      Dentition: Abnormal dentition.   Eyes:      Extraocular Movements: Extraocular movements intact.   Cardiovascular:      Rate and Rhythm: Normal rate and regular rhythm.      Heart sounds: No murmur heard.  Pulmonary:      Effort: Pulmonary effort is normal. No respiratory distress.      Breath sounds: Normal breath sounds. No wheezing.   Abdominal:      General: Bowel sounds are normal. There is no distension.      Palpations: Abdomen is soft. There is no mass.      Tenderness: There is abdominal tenderness in the epigastric area.   Musculoskeletal:      Cervical back: Normal range of motion and neck supple.      Right lower leg: No edema.      Left lower leg: No edema.   Skin:     General: Skin is warm and dry.      Findings: No rash.   Neurological:      Mental Status: She is alert and oriented to person, place, and time.      Cranial  Nerves: No cranial nerve deficit.   Psychiatric:         Behavior: Behavior normal.       Significant Labs:  CBC:   Recent Labs   Lab 02/21/22 0921   WBC 12.67   HGB 5.5*   HCT 18.1*        CMP:   Recent Labs   Lab 02/21/22 0921   *   CALCIUM 8.1*   ALBUMIN 2.8*   PROT 5.8*      K 4.0   CO2 25      BUN 21*   CREATININE 0.7   ALKPHOS 103   ALT 26   AST 23   BILITOT 0.1     Coagulation:   Recent Labs   Lab 02/21/22 0921   INR 0.9   APTT 22.8       Significant Imaging:  Imaging results within the past 24 hours have been reviewed.    Assessment/Plan:     * Hematemesis with nausea  The patient has coffee ground emesis and melena in the setting of chronic NSAID use. Upper GI bleed suspected.   Agree with blood transfusion and IV Protonix.   Will plan on EGD today or tomorrow depending on resuscitation.   Keep NPO.      Acute blood loss anemia  Continue to monitor H/H and transfuse additional units as needed.     NSAID long-term use  Avoid NSAIDs.        Thank you for your consult. I will follow-up with patient. Please contact us if you have any additional questions.    Ottoniel Jaramillo PA-C  Gastroenterology  [facility] - Emergency Dept.

## 2022-02-22 VITALS
HEART RATE: 68 BPM | BODY MASS INDEX: 29.49 KG/M2 | RESPIRATION RATE: 18 BRPM | OXYGEN SATURATION: 95 % | WEIGHT: 177 LBS | SYSTOLIC BLOOD PRESSURE: 143 MMHG | TEMPERATURE: 98 F | HEIGHT: 65 IN | DIASTOLIC BLOOD PRESSURE: 76 MMHG

## 2022-02-22 LAB
BASOPHILS # BLD AUTO: 0.05 K/UL (ref 0–0.2)
BASOPHILS NFR BLD: 0.5 % (ref 0–1.9)
DIFFERENTIAL METHOD: ABNORMAL
EOSINOPHIL # BLD AUTO: 0.2 K/UL (ref 0–0.5)
EOSINOPHIL NFR BLD: 1.6 % (ref 0–8)
ERYTHROCYTE [DISTWIDTH] IN BLOOD BY AUTOMATED COUNT: 16.8 % (ref 11.5–14.5)
ESTIMATED AVG GLUCOSE: 114 MG/DL (ref 68–131)
HBA1C MFR BLD: 5.6 % (ref 4–5.6)
HCT VFR BLD AUTO: 27.7 % (ref 37–48.5)
HGB BLD-MCNC: 8.9 G/DL (ref 12–16)
IMM GRANULOCYTES # BLD AUTO: 0.08 K/UL (ref 0–0.04)
IMM GRANULOCYTES NFR BLD AUTO: 0.8 % (ref 0–0.5)
LYMPHOCYTES # BLD AUTO: 2.2 K/UL (ref 1–4.8)
LYMPHOCYTES NFR BLD: 21.1 % (ref 18–48)
MCH RBC QN AUTO: 26.9 PG (ref 27–31)
MCHC RBC AUTO-ENTMCNC: 32.1 G/DL (ref 32–36)
MCV RBC AUTO: 84 FL (ref 82–98)
MONOCYTES # BLD AUTO: 0.7 K/UL (ref 0.3–1)
MONOCYTES NFR BLD: 6.7 % (ref 4–15)
NEUTROPHILS # BLD AUTO: 7.2 K/UL (ref 1.8–7.7)
NEUTROPHILS NFR BLD: 69.3 % (ref 38–73)
NRBC BLD-RTO: 1 /100 WBC
PLATELET # BLD AUTO: 261 K/UL (ref 150–450)
PMV BLD AUTO: 9.4 FL (ref 9.2–12.9)
RBC # BLD AUTO: 3.31 M/UL (ref 4–5.4)
WBC # BLD AUTO: 10.4 K/UL (ref 3.9–12.7)

## 2022-02-22 PROCEDURE — 63600175 PHARM REV CODE 636 W HCPCS: Performed by: NURSE PRACTITIONER

## 2022-02-22 PROCEDURE — 25000003 PHARM REV CODE 250: Performed by: NURSE PRACTITIONER

## 2022-02-22 PROCEDURE — 99232 SBSQ HOSP IP/OBS MODERATE 35: CPT | Mod: ,,, | Performed by: PHYSICIAN ASSISTANT

## 2022-02-22 PROCEDURE — C9113 INJ PANTOPRAZOLE SODIUM, VIA: HCPCS | Performed by: NURSE PRACTITIONER

## 2022-02-22 PROCEDURE — 99232 PR SUBSEQUENT HOSPITAL CARE,LEVL II: ICD-10-PCS | Mod: ,,, | Performed by: PHYSICIAN ASSISTANT

## 2022-02-22 PROCEDURE — 36415 COLL VENOUS BLD VENIPUNCTURE: CPT | Performed by: NURSE PRACTITIONER

## 2022-02-22 PROCEDURE — 85025 COMPLETE CBC W/AUTO DIFF WBC: CPT | Performed by: NURSE PRACTITIONER

## 2022-02-22 PROCEDURE — 25000003 PHARM REV CODE 250: Performed by: INTERNAL MEDICINE

## 2022-02-22 RX ORDER — SUCRALFATE 1 G/1
1 TABLET ORAL
Qty: 56 TABLET | Refills: 0 | Status: SHIPPED | OUTPATIENT
Start: 2022-02-22 | End: 2022-03-08

## 2022-02-22 RX ORDER — IPRATROPIUM BROMIDE AND ALBUTEROL SULFATE 2.5; .5 MG/3ML; MG/3ML
3 SOLUTION RESPIRATORY (INHALATION) EVERY 6 HOURS PRN
Status: DISCONTINUED | OUTPATIENT
Start: 2022-02-22 | End: 2022-02-22 | Stop reason: HOSPADM

## 2022-02-22 RX ORDER — AMLODIPINE BESYLATE 10 MG/1
10 TABLET ORAL DAILY
Qty: 90 TABLET | Refills: 1 | Status: SHIPPED | OUTPATIENT
Start: 2022-02-23 | End: 2022-05-24

## 2022-02-22 RX ORDER — PANTOPRAZOLE SODIUM 40 MG/1
40 TABLET, DELAYED RELEASE ORAL 2 TIMES DAILY
Qty: 60 TABLET | Refills: 1 | Status: SHIPPED | OUTPATIENT
Start: 2022-02-22 | End: 2022-04-23

## 2022-02-22 RX ADMIN — SUCRALFATE 1 G: 1 SUSPENSION ORAL at 11:02

## 2022-02-22 RX ADMIN — AMLODIPINE BESYLATE 10 MG: 10 TABLET ORAL at 08:02

## 2022-02-22 RX ADMIN — MUPIROCIN: 20 OINTMENT TOPICAL at 08:02

## 2022-02-22 RX ADMIN — PANTOPRAZOLE SODIUM 40 MG: 40 INJECTION, POWDER, FOR SOLUTION INTRAVENOUS at 08:02

## 2022-02-22 RX ADMIN — SUCRALFATE 1 G: 1 SUSPENSION ORAL at 05:02

## 2022-02-22 NOTE — ASSESSMENT & PLAN NOTE
EGD showed esophagitis with ulcer and bleeding.   Recommend Protonix 40 mg bid for two months and Carafate qid for two weeks.   Follow up with GI clinic and repeat EGD in two months.   Advance diet as slowly as tolerated.

## 2022-02-22 NOTE — HOSPITAL COURSE
52 yo female with a PMH of Asthma only and only home meds consist of daily Advil use and PRN Albuterol MDI.  She presented to [facility] ED this AM about 0900 hr via EMS due to malaise progressive over 3 days with associated melena and N/V.  She admits to frequent NSAID use daily for chronic RLE pain.  Had episode hematemesis in ED with epigastric abd pain.  In ED Hgb 5.5.  GI consulted and ordered 2 units PRBCs and started on PPI.  Plan admit to ICU and EGD in ICU asap.  Of note, UDS + Amphetamines not listed on home meds.   2/21 - Admitted to ICU.  EGD done upon arrival revealing esophageal ulcer with visible vessel in the background of esophagitis treated with Epi inj.  She received 3 units PRBCs and Hgb up to 8.9.  On exam she is supine in bed fully AA&OX3 in no distress with VSS  2/22 Pt was seen and examined at bedside . She was suitable for d/c home   She is s/p EGD and  3 PRBC . H/H stable . No sign of  overt GIB . Case D/W GI which rec to cont PPI BID x 2 months and Carafate qid x 14 days . CM Consulted  for [facility] referl and Help with medication .

## 2022-02-22 NOTE — SUBJECTIVE & OBJECTIVE
Subjective:     Interval History:   The patient is feeling better today. No nausea, vomiting or abdominal pain. No BM. Hgb 8.9.     Review of Systems   Constitutional:         See Interval History for daily ROS.    Objective:     Vital Signs (Most Recent):  Temp: 98.9 °F (37.2 °C) (02/22/22 0720)  Pulse: 63 (02/22/22 0720)  Resp: 20 (02/22/22 0720)  BP: (!) 161/80 (02/22/22 0720)  SpO2: 97 % (02/22/22 0720)   Vital Signs (24h Range):  Temp:  [97.9 °F (36.6 °C)-98.9 °F (37.2 °C)] 98.9 °F (37.2 °C)  Pulse:  [63-86] 63  Resp:  [11-34] 20  SpO2:  [90 %-100 %] 97 %  BP: (115-201)/() 161/80     Weight: 80.3 kg (177 lb 0.5 oz) (02/21/22 1800)  Body mass index is 29.46 kg/m².      Intake/Output Summary (Last 24 hours) at 2/22/2022 1029  Last data filed at 2/22/2022 0500  Gross per 24 hour   Intake 1723.63 ml   Output 1850 ml   Net -126.37 ml       Lines/Drains/Airways       Peripheral Intravenous Line  Duration                  Peripheral IV - Single Lumen 02/21/22 0000 18 G Right Antecubital 1 day         Peripheral IV - Single Lumen 02/21/22 0919 18 G Left Antecubital 1 day                    Physical Exam  Constitutional:       General: She is not in acute distress.     Appearance: She is well-developed. She is not diaphoretic.   HENT:      Head: Normocephalic and atraumatic.   Eyes:      Extraocular Movements: Extraocular movements intact.   Cardiovascular:      Rate and Rhythm: Normal rate and regular rhythm.   Pulmonary:      Effort: Pulmonary effort is normal. No respiratory distress.      Breath sounds: Normal breath sounds. No wheezing.   Abdominal:      General: Bowel sounds are normal. There is no distension.      Palpations: Abdomen is soft.      Tenderness: There is no abdominal tenderness.   Neurological:      Mental Status: She is alert and oriented to person, place, and time.      Cranial Nerves: No cranial nerve deficit.   Psychiatric:         Behavior: Behavior normal.       Significant Labs:  CBC:    Recent Labs   Lab 02/21/22  1031 02/21/22  1452 02/21/22  1950 02/22/22  0857   WBC 12.68  --  16.36* 10.40   HGB 7.2* 8.9* 9.7* 8.9*   HCT 23.4* 27.4* 30.5* 27.7*     --  250 261         Significant Imaging:  Imaging results within the past 24 hours have been reviewed.

## 2022-02-22 NOTE — PROGRESS NOTES
[facility]  Gastroenterology  Progress Note    Patient Name: Kristen Gates  MRN: [id number]  Admission Date: 2/21/2022  Hospital Length of Stay: 1 days  Code Status: Full Code   Attending Provider: Wilbert Oliva, *  Consulting Provider: Ottoniel Jaramillo PA-C  Primary Care Physician: Primary Doctor No  Principal Problem: Acute blood loss anemia      Subjective:     Interval History:   The patient is feeling better today. No nausea, vomiting or abdominal pain. No BM. Hgb 8.9.     Review of Systems   Constitutional:         See Interval History for daily ROS.    Objective:     Vital Signs (Most Recent):  Temp: 98.9 °F (37.2 °C) (02/22/22 0720)  Pulse: 63 (02/22/22 0720)  Resp: 20 (02/22/22 0720)  BP: (!) 161/80 (02/22/22 0720)  SpO2: 97 % (02/22/22 0720)   Vital Signs (24h Range):  Temp:  [97.9 °F (36.6 °C)-98.9 °F (37.2 °C)] 98.9 °F (37.2 °C)  Pulse:  [63-86] 63  Resp:  [11-34] 20  SpO2:  [90 %-100 %] 97 %  BP: (115-201)/() 161/80     Weight: 80.3 kg (177 lb 0.5 oz) (02/21/22 1800)  Body mass index is 29.46 kg/m².      Intake/Output Summary (Last 24 hours) at 2/22/2022 1029  Last data filed at 2/22/2022 0500  Gross per 24 hour   Intake 1723.63 ml   Output 1850 ml   Net -126.37 ml       Lines/Drains/Airways       Peripheral Intravenous Line  Duration                  Peripheral IV - Single Lumen 02/21/22 0000 18 G Right Antecubital 1 day         Peripheral IV - Single Lumen 02/21/22 0919 18 G Left Antecubital 1 day                    Physical Exam  Constitutional:       General: She is not in acute distress.     Appearance: She is well-developed. She is not diaphoretic.   HENT:      Head: Normocephalic and atraumatic.   Eyes:      Extraocular Movements: Extraocular movements intact.   Cardiovascular:      Rate and Rhythm: Normal rate and regular rhythm.   Pulmonary:      Effort: Pulmonary effort is normal. No respiratory distress.      Breath sounds: Normal breath sounds. No wheezing.    Abdominal:      General: Bowel sounds are normal. There is no distension.      Palpations: Abdomen is soft.      Tenderness: There is no abdominal tenderness.   Neurological:      Mental Status: She is alert and oriented to person, place, and time.      Cranial Nerves: No cranial nerve deficit.   Psychiatric:         Behavior: Behavior normal.       Significant Labs:  CBC:   Recent Labs   Lab 02/21/22  1031 02/21/22  1452 02/21/22  1950 02/22/22  0857   WBC 12.68  --  16.36* 10.40   HGB 7.2* 8.9* 9.7* 8.9*   HCT 23.4* 27.4* 30.5* 27.7*     --  250 261         Significant Imaging:  Imaging results within the past 24 hours have been reviewed.    Assessment/Plan:       Ulcer of esophagus with bleeding  EGD showed esophagitis with ulcer and bleeding.   Recommend Protonix 40 mg bid for two months and Carafate qid for two weeks.   Refer to [facility].    Advance diet as slowly as tolerated.     Acute blood loss anemia  Hgb better than at admit.      NSAID long-term use  Avoid NSAIDs.        Thank you for your consult. I will sign off. Please contact us if you have any additional questions.    Ottoniel Jaramillo PA-C  Gastroenterology  [facility]

## 2022-02-22 NOTE — PLAN OF CARE
[facility]  Discharge Assessment    Primary Care Provider: Primary Doctor No     Discharge Assessment (most recent)     BRIEF DISCHARGE ASSESSMENT - 02/22/22 1130        Discharge Planning    Assessment Type Discharge Planning Brief Assessment     Resource/Environmental Concerns none     Support Systems Children     Equipment Currently Used at Home none     Current Living Arrangements home/apartment/condo     Patient/Family Anticipates Transition to home with family     Patient/Family Anticipated Services at Transition none     DME Needed Upon Discharge  none     Discharge Plan A Home with family

## 2022-02-22 NOTE — DISCHARGE SUMMARY
[facility] Medicine  Discharge Summary      Patient Name: Kristen Gates  MRN: [id number]  Patient Class: IP- Inpatient  Admission Date: 2/21/2022  Hospital Length of Stay: 1 days  Discharge Date and Time:  02/22/2022 10:50 AM  Attending Physician: Wilbert Oliva, *   Discharging Provider: Wilbert Oliva MD  Primary Care Provider: Primary Doctor No      HPI:   Kristen Gates is a 51 y.o. female who presented to the Emergency Department today with c/o generalized weakness for the last 3 days days. It has been constant and of moderate severity.  Aggravating factors include none. Alleviating factors include none. Associated symptoms include fatigue, melena, epigastric abdominal pain, nausea, and hematemesis. Endorses chronic daily NSAID use of ASA and ibuprofen.      Full Code. Lora Covington, daughter in [place], and Julieta brandon Covingtonugher who lives local.      Procedure(s) (LRB):  EGD (ESOPHAGOGASTRODUODENOSCOPY) (N/A)      Hospital Course:    52 yo female with a PMH of Asthma only and only home meds consist of daily Advil use and PRN Albuterol MDI.  She presented to [facility] ED this AM about 0900 hr via EMS due to malaise progressive over 3 days with associated melena and N/V.  She admits to frequent NSAID use daily for chronic RLE pain.  Had episode hematemesis in ED with epigastric abd pain.  In ED Hgb 5.5.  GI consulted and ordered 2 units PRBCs and started on PPI.  Plan admit to ICU and EGD in ICU asap.  Of note, UDS + Amphetamines not listed on home meds.   2/21 - Admitted to ICU.  EGD done upon arrival revealing esophageal ulcer with visible vessel in the background of esophagitis treated with Epi inj.  She received 3 units PRBCs and Hgb up to 8.9.  On exam she is supine in bed fully AA&OX3 in no distress with VSS  2/22 Pt was seen and examined at bedside . She was suitable for d/c home   She is s/p EGD and  3 PRBC . H/H stable . No sign of  overt GIB . Case D/W GI  which rec to cont PPI BID x 2 months and Carafate qid x 14 days . CM Consulted  for [facility] referl and Help with medication .        Goals of Care Treatment Preferences:  Code Status: Full Code      Consults:   Consults (From admission, onward)        Status Ordering Provider     Inpatient consult to Social Work  Once        Provider:  (Not yet assigned)    Acknowledged SARITA GAN     Inpatient consult to Pulmonology  Once        Provider:  Cole Ching, NP    Completed SARITA GAN     Inpatient consult to Gastroenterology  Once        Provider:  (Not yet assigned)    Completed AZIZA WERNER          No new Assessment & Plan notes have been filed under this hospital service since the last note was generated.  Service: Hospital Medicine    Final Active Diagnoses:    Diagnosis Date Noted POA    PRINCIPAL PROBLEM:  Acute blood loss anemia [D62] 02/21/2022 Yes    Ulcer of esophagus with bleeding [K22.11] 02/21/2022 Yes    NSAID long-term use [Z79.1] 02/21/2022 Not Applicable     Chronic    Essential hypertension [I10] 02/21/2022 Yes    Amphetamine abuse [F15.10] 02/21/2022 Yes      Problems Resolved During this Admission:       Discharged Condition: stable    Disposition: Home or Self Care    Follow Up:   Follow-up Information     [facility] Follow up in 1 month(s).                     Patient Instructions:      Diet Adult Regular     Activity as tolerated       Significant Diagnostic Studies: Labs:   BMP:   Recent Labs   Lab 02/21/22  0921   *      K 4.0      CO2 25   BUN 21*   CREATININE 0.7   CALCIUM 8.1*   , CMP   Recent Labs   Lab 02/21/22  0921      K 4.0      CO2 25   *   BUN 21*   CREATININE 0.7   CALCIUM 8.1*   PROT 5.8*   ALBUMIN 2.8*   BILITOT 0.1   ALKPHOS 103   AST 23   ALT 26   ANIONGAP 6*   ESTGFRAFRICA >60   EGFRNONAA >60    and CBC   Recent Labs   Lab 02/21/22  1031 02/21/22  1452 02/21/22  1950 02/22/22  0857   WBC  12.68  --  16.36* 10.40   HGB 7.2* 8.9* 9.7* 8.9*   HCT 23.4* 27.4* 30.5* 27.7*     --  250 261     Microbiology: Blood Culture No results found for: LABBLOO    Pending Diagnostic Studies:     Procedure Component Value Units Date/Time    CBC auto differential [[id number]]     Order Status: Sent Lab Status: No result     Specimen: Blood     Specimen to Pathology, Surgery Gastrointestinal tract [[id number]] Collected: 02/21/22 1527    Order Status: Sent Lab Status: In process Updated: 02/22/22 0814         Medications:  Reconciled Home Medications:      Medication List      START taking these medications    amLODIPine 10 MG tablet  Commonly known as: NORVASC  Take 1 tablet (10 mg total) by mouth once daily.  Start taking on: February 23, 2022     pantoprazole 40 MG tablet  Commonly known as: PROTONIX  Take 1 tablet (40 mg total) by mouth 2 (two) times daily.     sucralfate 1 gram tablet  Commonly known as: CARAFATE  Take 1 tablet (1 g total) by mouth 4 (four) times daily before meals and nightly. for 14 days        STOP taking these medications    albuterol 90 mcg/actuation inhaler  Commonly known as: PROVENTIL/VENTOLIN HFA     ibuprofen 200 MG tablet  Commonly known as: ADVIL,MOTRIN            Indwelling Lines/Drains at time of discharge:   Lines/Drains/Airways     None                 Time spent on the discharge of patient: 32 minutes         Wilbert Oliva MD  Department of Hospital Medicine  '[facility]

## 2022-02-22 NOTE — PLAN OF CARE
[facility]  Discharge Final Note    Primary Care Provider: Primary Doctor No    Expected Discharge Date: 2/22/2022    Final Discharge Note (most recent)     Final Note - 02/22/22 1131        Final Note    Assessment Type Final Discharge Note     Anticipated Discharge Disposition Home or Self Care     Hospital Resources/Appts/Education Provided Provided patient/caregiver with written discharge plan information                              Contact Info     [facility]        Next Steps: Follow up in 1 month(s)        Referral faxed to [facility]. Requested they contact patient to schedule appointment.

## 2022-02-22 NOTE — PLAN OF CARE
Problem: Adult Inpatient Plan of Care  Goal: Plan of Care Review  Outcome: Ongoing, Progressing  Goal: Patient-Specific Goal (Individualized)  Outcome: Ongoing, Progressing  Goal: Absence of Hospital-Acquired Illness or Injury  Outcome: Ongoing, Progressing  Goal: Optimal Comfort and Wellbeing  Outcome: Ongoing, Progressing  Goal: Readiness for Transition of Care  Outcome: Ongoing, Progressing     Problem: Adjustment to Illness (Gastrointestinal Bleeding)  Goal: Optimal Coping with Acute Illness  Outcome: Ongoing, Progressing     Problem: Bleeding (Gastrointestinal Bleeding)  Goal: Hemostasis  Outcome: Ongoing, Progressing

## 2022-02-23 NOTE — ANESTHESIA POSTPROCEDURE EVALUATION
Anesthesia Post Evaluation    Patient: Kristen Gates    Procedure(s) Performed: Procedure(s) (LRB):  EGD (ESOPHAGOGASTRODUODENOSCOPY) (N/A)    Final Anesthesia Type: MAC      Patient location during evaluation: GI PACU  Patient participation: Yes- Able to Participate  Level of consciousness: awake and alert  Post-procedure vital signs: reviewed and stable  Pain management: adequate  Airway patency: patent    PONV status at discharge: No PONV  Anesthetic complications: no      Cardiovascular status: blood pressure returned to baseline  Respiratory status: unassisted  Hydration status: euvolemic  Follow-up not needed.          Vitals Value Taken Time   /76 02/22/22 1110   Temp 36.8 °C (98.2 °F) 02/22/22 1110   Pulse 68 02/22/22 1110   Resp 18 02/22/22 1110   SpO2 95 % 02/22/22 1110         No case tracking events are documented in the log.      Pain/Milly Score: No data recorded

## 2022-02-24 LAB
FINAL PATHOLOGIC DIAGNOSIS: NORMAL
GROSS: NORMAL
Lab: NORMAL

## 2022-02-25 LAB
BLD PROD TYP BPU: NORMAL
BLD PROD TYP BPU: NORMAL
BLOOD UNIT EXPIRATION DATE: NORMAL
BLOOD UNIT EXPIRATION DATE: NORMAL
BLOOD UNIT TYPE CODE: 7300
BLOOD UNIT TYPE CODE: 7300
BLOOD UNIT TYPE: NORMAL
BLOOD UNIT TYPE: NORMAL
CODING SYSTEM: NORMAL
CODING SYSTEM: NORMAL
DISPENSE STATUS: NORMAL
DISPENSE STATUS: NORMAL
NUM UNITS TRANS PACKED RBC: NORMAL
NUM UNITS TRANS PACKED RBC: NORMAL

## 2022-02-27 NOTE — PROGRESS NOTES
Please contact patient and inform biopsies of stomach and esophagus show no signs of infection. Please continue to take the medicine for the ulcer in the esophagus. You will need a repeat endoscopy to confirm healing of the area in 2 months. Please follow up with [facility] and they will arrange this for you.

## 2023-06-06 NOTE — SUBJECTIVE & OBJECTIVE
History reviewed. No pertinent past medical history.    Past Surgical History:   Procedure Laterality Date    APPENDECTOMY       SECTION      TUBAL LIGATION         Review of patient's allergies indicates:  No Known Allergies    No current facility-administered medications on file prior to encounter.     No current outpatient medications on file prior to encounter.     Family History       Problem Relation (Age of Onset)    Asthma Brother    Diabetes Mother, Sister, Brother    Hypertension Mother, Sister, Brother    No Known Problems Father, Daughter, Daughter, Daughter          Tobacco Use    Smoking status: Never Smoker    Smokeless tobacco: Never Used   Substance and Sexual Activity    Alcohol use: Not Currently    Drug use: Not Currently    Sexual activity: Not Currently     Review of Systems   Constitutional:  Positive for activity change, appetite change and fatigue. Negative for chills, diaphoresis and fever.   HENT:  Negative for postnasal drip, sinus pressure and trouble swallowing.         +dental carries   Eyes:  Negative for visual disturbance.   Respiratory:  Negative for cough, chest tightness, shortness of breath and wheezing.    Cardiovascular:  Positive for palpitations. Negative for chest pain and leg swelling.   Gastrointestinal:  Positive for abdominal pain, blood in stool, nausea and vomiting. Negative for abdominal distention, constipation and diarrhea.        +melena  + hematemesis   Genitourinary:  Negative for difficulty urinating, dysuria, flank pain, hematuria and urgency.   Musculoskeletal:  Positive for myalgias (generalized). Negative for gait problem.   Skin:  Negative for rash and wound.   Neurological:  Positive for dizziness, weakness (generalized), light-headedness and headaches.   Psychiatric/Behavioral:  Negative for behavioral problems, decreased concentration, dysphoric mood and sleep disturbance. The patient is nervous/anxious.    All other systems reviewed and are  negative.  Objective:     Vital Signs (Most Recent):  Temp: 97.9 °F (36.6 °C) (02/21/22 1010)  Pulse: 79 (02/21/22 1033)  Resp: 20 (02/21/22 1033)  BP: (!) 158/90 (02/21/22 1033)  SpO2: 100 % (02/21/22 1033)   Vital Signs (24h Range):  Temp:  [97.9 °F (36.6 °C)-98.4 °F (36.9 °C)] 97.9 °F (36.6 °C)  Pulse:  [75-85] 79  Resp:  [10-25] 20  SpO2:  [97 %-100 %] 100 %  BP: (129-184)/() 158/90     Weight: 80.4 kg (177 lb 4 oz)  Body mass index is 29.5 kg/m².    Physical Exam  Vitals and nursing note reviewed.   Constitutional:       General: She is awake. She is not in acute distress.     Appearance: She is obese. She is ill-appearing and toxic-appearing.   HENT:      Head: Normocephalic and atraumatic.      Comments: Pale mucous membranes     Mouth/Throat:      Mouth: Mucous membranes are dry.      Dentition: Abnormal dentition. Dental caries present.      Comments: Poor dentition, several decaying and missing teeth  Eyes:      Extraocular Movements: Extraocular movements intact.      Conjunctiva/sclera: Conjunctivae normal.   Cardiovascular:      Rate and Rhythm: Regular rhythm. Tachycardia present.      Pulses: Normal pulses.      Heart sounds: Normal heart sounds. No murmur heard.  Pulmonary:      Effort: Pulmonary effort is normal. Tachypnea present. No accessory muscle usage or respiratory distress.      Breath sounds: Normal breath sounds. No wheezing or rales.   Abdominal:      General: Bowel sounds are normal.      Tenderness: There is abdominal tenderness.   Genitourinary:     Comments: deferred  Musculoskeletal:         General: Normal range of motion.      Cervical back: Normal range of motion.   Skin:     General: Skin is cool.      Capillary Refill: Capillary refill takes more than 3 seconds.      Coloration: Skin is ashen and pale.   Neurological:      Mental Status: She is alert and oriented to person, place, and time.   Psychiatric:         Mood and Affect: Mood is anxious. Affect is tearful.          As per HPI Behavior: Behavior is cooperative.           Significant Labs: All pertinent labs within the past 24 hours have been reviewed.  Recent Lab Results         02/21/22  1031   02/21/22  1003   02/21/22  0931   02/21/22  0921        Unit Blood Type Code       9500  [P]              9500  [P]       Unit Expiration       [id number]  [P]              [id number]  [P]       Unit Blood Type       O NEG  [P]              O NEG  [P]       Albumin       2.8       Alkaline Phosphatase       103       ALT       26       Anion Gap       6       aPTT       22.8  Comment: aPTT therapeutic range = 39-69 seconds       AST       23       Baso # 0.06       0.04       Basophil % 0.5       0.3       BILIRUBIN TOTAL       0.1  Comment: For infants and newborns, interpretation of results should be based  on gestational age, weight and in agreement with clinical  observations.    Premature Infant recommended reference ranges:  Up to 24 hours.............<8.0 mg/dL  Up to 48 hours............<12.0 mg/dL  3-5 days..................<15.0 mg/dL  6-29 days.................<15.0 mg/dL         BUN       21       Calcium       8.1       Chloride       107       CO2       25       CODING SYSTEM       TFXI156  [P]              [id number]  [P]       Creatinine       0.7       Differential Method Automated       Automated       DISPENSE STATUS       ISSUED  [P]              ISSUED  [P]       eGFR if        >60       eGFR if non        >60  Comment: Calculation used to obtain the estimated glomerular filtration  rate (eGFR) is the CKD-EPI equation.          Eos # 0.1       0.2       Eosinophil % 0.5       1.2       Glucose       145       Gran # (ANC) 10.0       8.9       Gran % 78.7       70.2       Group & Rh       B POS       Hematocrit 23.4       18.1  Comment: Results confirmed, test repeated  HGB/HCT  critical result(s) called and verbal readback obtained from   OMID ESCOBAR RN  by OBDULIA 02/21/2022 09:39          Hemoglobin 7.2       5.5  Comment: Results confirmed, test repeated  HGB/HCT  critical result(s) called and verbal readback obtained from   OMID ESCOBAR RN  by CN1 02/21/2022 09:39         Immature Grans (Abs) 0.21  Comment: Mild elevation in immature granulocytes is non specific and   can be seen in a variety of conditions including stress response,   acute inflammation, trauma and pregnancy. Correlation with other   laboratory and clinical findings is essential.         0.23  Comment: Mild elevation in immature granulocytes is non specific and   can be seen in a variety of conditions including stress response,   acute inflammation, trauma and pregnancy. Correlation with other   laboratory and clinical findings is essential.         Immature Granulocytes 1.7       1.8       INDIRECT DOUGLAS       NEG       INR       0.9  Comment: Coumadin Therapy:  2.0 - 3.0 for INR for all indicators except mechanical heart valves  and antiphospholipid syndromes which should use 2.5 - 3.5.         Lipase       53       Lymph # 1.6       2.6       Lymph % 12.8       20.8       MCH 27.1       26.7       MCHC 30.8       30.4       MCV 88       88       Mono # 0.7       0.7       Mono % 5.8       5.7       MPV 9.9       9.4       nRBC 2       3       Occult Blood     Positive         Platelet Estimate       Appears normal       Platelets 234       308       Potassium       4.0       Product Code       C1840V35  [P]              [id number]  [P]       PROTEIN TOTAL       5.8       Protime       10.5        Acceptable   Yes           RBC 2.66       2.06       RDW 15.7       16.2       SARS-CoV-2 RNA, Amplification, Qual   Negative           Sodium       138       UNIT NUMBER       [id number]  [P]              [id number]  [P]       WBC 12.68       12.67                [P] - Preliminary Result               Significant Imaging: I have reviewed all pertinent imaging results/findings within the past 24 hours.  Imaging  Results              X-Ray Abdomen Flat And Erect (In process)  Result time 02/21/22 10:27:21

## 2024-01-10 NOTE — PROVATION PATIENT INSTRUCTIONS
Discharge Summary/Instructions after an Endoscopic Procedure  Patient Name: Kristen Gates  Patient MRN: [id number]  Patient YOB: 1970  Monday, February 21, 2022 Deidre Babcock MD  Dear patient,  As a result of recent federal legislation (The Federal Cures Act), you may   receive lab or pathology results from your procedure in your [facility]   account before your physician is able to contact you. Your physician or   their representative will relay the results to you with their   recommendations at their soonest availability.  Thank you,  RESTRICTIONS:  During your procedure today, you received medications for sedation.  These   medications may affect your judgment, balance and coordination.  Therefore,   for 24 hours, you have the following restrictions:   - DO NOT drive a car, operate machinery, make legal/financial decisions,   sign important papers or drink alcohol.    ACTIVITY:  Today: no heavy lifting, straining or running due to procedural   sedation/anesthesia.  The following day: return to full activity including work.  DIET:  Eat and drink normally unless instructed otherwise.     TREATMENT FOR COMMON SIDE EFFECTS:  - Mild abdominal pain, nausea, belching, bloating or excessive gas:  rest,   eat lightly and use a heating pad.  - Sore Throat: treat with throat lozenges and/or gargle with warm salt   water.  - Because air was used during the procedure, expelling large amounts of air   from your rectum or belching is normal.  - If a bowel prep was taken, you may not have a bowel movement for 1-3 days.    This is normal.  SYMPTOMS TO WATCH FOR AND REPORT TO YOUR PHYSICIAN:  1. Abdominal pain or bloating, other than gas cramps.  2. Chest pain.  3. Back pain.  4. Signs of infection such as: chills or fever occurring within 24 hours   after the procedure.  5. Rectal bleeding, which would show as bright red, maroon, or black stools.   (A tablespoon of blood from the rectum is not serious, especially  if   hemorrhoids are present.)  6. Vomiting.  7. Weakness or dizziness.  GO DIRECTLY TO THE NEAREST EMERGENCY ROOM IF YOU HAVE ANY OF THE FOLLOWING:      Difficulty breathing              Chills and/or fever over 101 F   Persistent vomiting and/or vomiting blood   Severe abdominal pain   Severe chest pain   Black, tarry stools   Bleeding- more than one tablespoon   Any other symptom or condition that you feel may need urgent attention  Your doctor recommends these additional instructions:  If any biopsies were taken, your doctors clinic will contact you in 1 to 2   weeks with any results.  - Return patient to ICU for ongoing care.   - Clear liquid diet today.   - Continue present medications.   - Use sucralfate suspension 1 gram PO QID.   - Observe patient's clinical course.   - Await pathology results.   - The findings and recommendations were discussed with the patient's family.     - Communicated via secure chat with ICU team.  For questions, problems or results please call your physician Deidre Babcock MD at Work:  [phone]  If you have any questions about the above instructions, call the GI   department at [phone] or call the endoscopy unit at [phone]   from 7am until 3 pm.  [facility] [place], EMERGENCY ROOM PHONE NUMBER:   [phone]  IF A COMPLICATION OR EMERGENCY SITUATION ARISES AND YOU ARE UNABLE TO REACH   YOUR PHYSICIAN - GO DIRECTLY TO THE EMERGENCY ROOM.  I have read or have had read to me these discharge instructions for my   procedure and have received a written copy.  I understand these   instructions and will follow-up with my physician if I have any questions.     __________________________________       _____________________________________  Nurse Signature                                          Patient/Designated   Responsible Party Signature  MD Deidre Patiño MD  2/22/2022 9:40:21 AM  This report has been verified and signed  electronically.  Dear patient,  As a result of recent federal legislation (The Federal Cures Act), you may   receive lab or pathology results from your procedure in your [facility]   account before your physician is able to contact you. Your physician or   their representative will relay the results to you with their   recommendations at their soonest availability.  Thank you,  [employer]OVATION   weight-bearing as tolerated

## 2025-08-14 ENCOUNTER — HOSPITAL ENCOUNTER (EMERGENCY)
Facility: HOSPITAL | Age: 55
Discharge: ANOTHER HOSPITAL | End: 2025-08-14
Attending: EMERGENCY MEDICINE
Payer: MEDICAID

## 2025-08-14 VITALS
TEMPERATURE: 98 F | RESPIRATION RATE: 20 BRPM | HEART RATE: 94 BPM | WEIGHT: 179 LBS | OXYGEN SATURATION: 100 % | DIASTOLIC BLOOD PRESSURE: 68 MMHG | BODY MASS INDEX: 29.79 KG/M2 | SYSTOLIC BLOOD PRESSURE: 128 MMHG

## 2025-08-14 DIAGNOSIS — R03.0 ELEVATED BLOOD PRESSURE READING: ICD-10-CM

## 2025-08-14 DIAGNOSIS — R44.9: ICD-10-CM

## 2025-08-14 DIAGNOSIS — R29.818 ACUTE FOCAL NEUROLOGICAL DEFICIT: ICD-10-CM

## 2025-08-14 DIAGNOSIS — I16.1 HYPERTENSIVE EMERGENCY: ICD-10-CM

## 2025-08-14 DIAGNOSIS — I62.9 INTRACRANIAL HEMORRHAGE: Primary | ICD-10-CM

## 2025-08-14 PROBLEM — I61.9 ICH (INTRACEREBRAL HEMORRHAGE): Status: ACTIVE | Noted: 2025-08-14

## 2025-08-14 LAB
ABSOLUTE EOSINOPHIL (OHS): 0.08 K/UL
ABSOLUTE EOSINOPHIL (OHS): 0.09 K/UL
ABSOLUTE MONOCYTE (OHS): 0.53 K/UL (ref 0.3–1)
ABSOLUTE MONOCYTE (OHS): 0.58 K/UL (ref 0.3–1)
ABSOLUTE NEUTROPHIL COUNT (OHS): 4.92 K/UL (ref 1.8–7.7)
ABSOLUTE NEUTROPHIL COUNT (OHS): 5.3 K/UL (ref 1.8–7.7)
ALBUMIN SERPL BCP-MCNC: 3.9 G/DL (ref 3.5–5.2)
ALBUMIN SERPL BCP-MCNC: 4.2 G/DL (ref 3.5–5.2)
ALP SERPL-CCNC: 153 UNIT/L (ref 40–150)
ALP SERPL-CCNC: 157 UNIT/L (ref 40–150)
ALT SERPL W/O P-5'-P-CCNC: 20 UNIT/L (ref 10–44)
ALT SERPL W/O P-5'-P-CCNC: 23 UNIT/L (ref 10–44)
ANION GAP (OHS): 7 MMOL/L (ref 8–16)
ANION GAP (OHS): 8 MMOL/L (ref 8–16)
AST SERPL-CCNC: 24 UNIT/L (ref 11–45)
AST SERPL-CCNC: 26 UNIT/L (ref 11–45)
BASOPHILS # BLD AUTO: 0.06 K/UL
BASOPHILS # BLD AUTO: 0.07 K/UL
BASOPHILS NFR BLD AUTO: 0.8 %
BASOPHILS NFR BLD AUTO: 0.9 %
BILIRUB SERPL-MCNC: 0.3 MG/DL (ref 0.1–1)
BILIRUB SERPL-MCNC: 0.3 MG/DL (ref 0.1–1)
BILIRUB UR QL STRIP.AUTO: NEGATIVE
BUN SERPL-MCNC: 11 MG/DL (ref 6–20)
BUN SERPL-MCNC: 12 MG/DL (ref 6–20)
CALCIUM SERPL-MCNC: 9.1 MG/DL (ref 8.7–10.5)
CALCIUM SERPL-MCNC: 9.4 MG/DL (ref 8.7–10.5)
CHLORIDE SERPL-SCNC: 106 MMOL/L (ref 95–110)
CHLORIDE SERPL-SCNC: 107 MMOL/L (ref 95–110)
CHOLEST SERPL-MCNC: 146 MG/DL (ref 120–199)
CHOLEST/HDLC SERPL: 2.5 {RATIO} (ref 2–5)
CLARITY UR: CLEAR
CO2 SERPL-SCNC: 23 MMOL/L (ref 23–29)
CO2 SERPL-SCNC: 25 MMOL/L (ref 23–29)
COLOR UR AUTO: YELLOW
CREAT SERPL-MCNC: 0.7 MG/DL (ref 0.5–1.4)
CREAT SERPL-MCNC: 0.7 MG/DL (ref 0.5–1.4)
ERYTHROCYTE [DISTWIDTH] IN BLOOD BY AUTOMATED COUNT: 14.6 % (ref 11.5–14.5)
ERYTHROCYTE [DISTWIDTH] IN BLOOD BY AUTOMATED COUNT: 14.9 % (ref 11.5–14.5)
GFR SERPLBLD CREATININE-BSD FMLA CKD-EPI: >60 ML/MIN/1.73/M2
GFR SERPLBLD CREATININE-BSD FMLA CKD-EPI: >60 ML/MIN/1.73/M2
GLUCOSE SERPL-MCNC: 66 MG/DL (ref 70–110)
GLUCOSE SERPL-MCNC: 85 MG/DL (ref 70–110)
GLUCOSE UR QL STRIP: NEGATIVE
HCT VFR BLD AUTO: 40.6 % (ref 37–48.5)
HCT VFR BLD AUTO: 41.9 % (ref 37–48.5)
HDLC SERPL-MCNC: 59 MG/DL (ref 40–75)
HDLC SERPL: 40.4 % (ref 20–50)
HGB BLD-MCNC: 12.8 GM/DL (ref 12–16)
HGB BLD-MCNC: 13.2 GM/DL (ref 12–16)
HGB UR QL STRIP: NEGATIVE
IMM GRANULOCYTES # BLD AUTO: 0.02 K/UL (ref 0–0.04)
IMM GRANULOCYTES # BLD AUTO: 0.03 K/UL (ref 0–0.04)
IMM GRANULOCYTES NFR BLD AUTO: 0.3 % (ref 0–0.5)
IMM GRANULOCYTES NFR BLD AUTO: 0.4 % (ref 0–0.5)
INR PPP: 1 (ref 0.8–1.2)
KETONES UR QL STRIP: NEGATIVE
LDLC SERPL CALC-MCNC: 68.2 MG/DL (ref 63–159)
LEUKOCYTE ESTERASE UR QL STRIP: NEGATIVE
LYMPHOCYTES # BLD AUTO: 1.65 K/UL (ref 1–4.8)
LYMPHOCYTES # BLD AUTO: 1.68 K/UL (ref 1–4.8)
MCH RBC QN AUTO: 25.9 PG (ref 27–31)
MCH RBC QN AUTO: 26.1 PG (ref 27–31)
MCHC RBC AUTO-ENTMCNC: 31.5 G/DL (ref 32–36)
MCHC RBC AUTO-ENTMCNC: 31.5 G/DL (ref 32–36)
MCV RBC AUTO: 82 FL (ref 82–98)
MCV RBC AUTO: 83 FL (ref 82–98)
NITRITE UR QL STRIP: NEGATIVE
NONHDLC SERPL-MCNC: 87 MG/DL
NT-PROBNP SERPL-MCNC: 200 PG/ML
NUCLEATED RBC (/100WBC) (OHS): 0 /100 WBC
NUCLEATED RBC (/100WBC) (OHS): 0 /100 WBC
OHS QRS DURATION: 108 MS
OHS QTC CALCULATION: 499 MS
PH UR STRIP: 8 [PH]
PLATELET # BLD AUTO: 356 K/UL (ref 150–450)
PLATELET # BLD AUTO: 360 K/UL (ref 150–450)
PMV BLD AUTO: 9.6 FL (ref 9.2–12.9)
PMV BLD AUTO: 9.6 FL (ref 9.2–12.9)
POCT GLUCOSE: 127 MG/DL (ref 70–110)
POTASSIUM SERPL-SCNC: 3.7 MMOL/L (ref 3.5–5.1)
POTASSIUM SERPL-SCNC: 4.1 MMOL/L (ref 3.5–5.1)
PROT SERPL-MCNC: 8 GM/DL (ref 6–8.4)
PROT SERPL-MCNC: 8.4 GM/DL (ref 6–8.4)
PROT UR QL STRIP: NEGATIVE
PROTHROMBIN TIME: 11 SECONDS (ref 9–12.5)
RBC # BLD AUTO: 4.94 M/UL (ref 4–5.4)
RBC # BLD AUTO: 5.05 M/UL (ref 4–5.4)
RELATIVE EOSINOPHIL (OHS): 1.1 %
RELATIVE EOSINOPHIL (OHS): 1.2 %
RELATIVE LYMPHOCYTE (OHS): 21.4 % (ref 18–48)
RELATIVE LYMPHOCYTE (OHS): 23 % (ref 18–48)
RELATIVE MONOCYTE (OHS): 7.3 % (ref 4–15)
RELATIVE MONOCYTE (OHS): 7.5 % (ref 4–15)
RELATIVE NEUTROPHIL (OHS): 67.5 % (ref 38–73)
RELATIVE NEUTROPHIL (OHS): 68.6 % (ref 38–73)
SODIUM SERPL-SCNC: 136 MMOL/L (ref 136–145)
SODIUM SERPL-SCNC: 140 MMOL/L (ref 136–145)
SP GR UR STRIP: >=1.03
TRIGL SERPL-MCNC: 94 MG/DL (ref 30–150)
TROPONIN I SERPL HS-MCNC: 4 NG/L
TROPONIN I SERPL HS-MCNC: 6 NG/L
TSH SERPL-ACNC: 0.94 UIU/ML (ref 0.4–4)
UROBILINOGEN UR STRIP-ACNC: NEGATIVE EU/DL
WBC # BLD AUTO: 7.29 K/UL (ref 3.9–12.7)
WBC # BLD AUTO: 7.72 K/UL (ref 3.9–12.7)

## 2025-08-14 PROCEDURE — 81003 URINALYSIS AUTO W/O SCOPE: CPT | Performed by: NURSE PRACTITIONER

## 2025-08-14 PROCEDURE — 80053 COMPREHEN METABOLIC PANEL: CPT | Performed by: EMERGENCY MEDICINE

## 2025-08-14 PROCEDURE — 82040 ASSAY OF SERUM ALBUMIN: CPT | Performed by: NURSE PRACTITIONER

## 2025-08-14 PROCEDURE — 83880 ASSAY OF NATRIURETIC PEPTIDE: CPT | Performed by: EMERGENCY MEDICINE

## 2025-08-14 PROCEDURE — 85610 PROTHROMBIN TIME: CPT | Performed by: EMERGENCY MEDICINE

## 2025-08-14 PROCEDURE — 93010 ELECTROCARDIOGRAM REPORT: CPT | Mod: ,,, | Performed by: INTERNAL MEDICINE

## 2025-08-14 PROCEDURE — 93005 ELECTROCARDIOGRAM TRACING: CPT

## 2025-08-14 PROCEDURE — 85025 COMPLETE CBC W/AUTO DIFF WBC: CPT | Performed by: EMERGENCY MEDICINE

## 2025-08-14 PROCEDURE — 99448 NTRPROF PH1/NTRNET/EHR 21-30: CPT | Mod: ,,, | Performed by: PSYCHIATRY & NEUROLOGY

## 2025-08-14 PROCEDURE — 99291 CRITICAL CARE FIRST HOUR: CPT

## 2025-08-14 PROCEDURE — 80061 LIPID PANEL: CPT | Performed by: EMERGENCY MEDICINE

## 2025-08-14 PROCEDURE — 82962 GLUCOSE BLOOD TEST: CPT

## 2025-08-14 PROCEDURE — 63600175 PHARM REV CODE 636 W HCPCS: Performed by: EMERGENCY MEDICINE

## 2025-08-14 PROCEDURE — 25500020 PHARM REV CODE 255: Performed by: EMERGENCY MEDICINE

## 2025-08-14 PROCEDURE — 63600175 PHARM REV CODE 636 W HCPCS: Performed by: NURSE PRACTITIONER

## 2025-08-14 PROCEDURE — 84484 ASSAY OF TROPONIN QUANT: CPT | Performed by: EMERGENCY MEDICINE

## 2025-08-14 PROCEDURE — 96374 THER/PROPH/DIAG INJ IV PUSH: CPT

## 2025-08-14 PROCEDURE — 85025 COMPLETE CBC W/AUTO DIFF WBC: CPT | Performed by: NURSE PRACTITIONER

## 2025-08-14 PROCEDURE — 84443 ASSAY THYROID STIM HORMONE: CPT | Performed by: EMERGENCY MEDICINE

## 2025-08-14 PROCEDURE — 84484 ASSAY OF TROPONIN QUANT: CPT | Performed by: NURSE PRACTITIONER

## 2025-08-14 RX ORDER — NICARDIPINE HYDROCHLORIDE 0.2 MG/ML
0-15 INJECTION, SOLUTION INTRAVENOUS CONTINUOUS
Status: DISCONTINUED | OUTPATIENT
Start: 2025-08-14 | End: 2025-08-14 | Stop reason: HOSPADM

## 2025-08-14 RX ORDER — MORPHINE SULFATE 4 MG/ML
4 INJECTION, SOLUTION INTRAMUSCULAR; INTRAVENOUS
Refills: 0 | Status: COMPLETED | OUTPATIENT
Start: 2025-08-14 | End: 2025-08-14

## 2025-08-14 RX ORDER — HYDRALAZINE HYDROCHLORIDE 20 MG/ML
20 INJECTION INTRAMUSCULAR; INTRAVENOUS
Status: COMPLETED | OUTPATIENT
Start: 2025-08-14 | End: 2025-08-14

## 2025-08-14 RX ADMIN — HYDRALAZINE HYDROCHLORIDE 20 MG: 20 INJECTION INTRAMUSCULAR; INTRAVENOUS at 02:08

## 2025-08-14 RX ADMIN — IOHEXOL 100 ML: 350 INJECTION, SOLUTION INTRAVENOUS at 02:08

## 2025-08-14 RX ADMIN — MORPHINE SULFATE 4 MG: 4 INJECTION INTRAVENOUS at 02:08

## 2025-08-14 RX ADMIN — NICARDIPINE HYDROCHLORIDE 5 MG/HR: 0.2 INJECTION, SOLUTION INTRAVENOUS at 02:08

## 2025-08-15 PROBLEM — C80.1 METASTASIS TO BRAIN OF UNKNOWN ORIGIN: Status: ACTIVE | Noted: 2025-08-15

## 2025-08-15 PROBLEM — R01.1 SYSTOLIC MURMUR: Status: ACTIVE | Noted: 2025-08-15

## 2025-08-15 PROBLEM — C79.31 METASTASIS TO BRAIN OF UNKNOWN ORIGIN: Status: ACTIVE | Noted: 2025-08-15

## 2025-08-15 PROBLEM — Z73.6 LIMITATION OF ACTIVITY DUE TO DISABILITY: Status: ACTIVE | Noted: 2025-08-15

## 2025-08-15 LAB — HOLD SPECIMEN: NORMAL

## 2025-08-16 PROBLEM — R90.89 ABNORMAL FINDING ON MRI OF BRAIN: Status: ACTIVE | Noted: 2025-08-15

## 2025-08-16 PROBLEM — R20.0 RIGHT SIDED NUMBNESS: Status: ACTIVE | Noted: 2025-08-16

## 2025-08-16 LAB
OHS QRS DURATION: 96 MS
OHS QTC CALCULATION: 459 MS

## 2025-08-17 PROBLEM — T43.625A: Status: ACTIVE | Noted: 2025-08-17

## 2025-08-17 PROBLEM — I61.9 HEMORRHAGIC STROKE: Status: ACTIVE | Noted: 2025-08-17

## 2025-08-17 PROBLEM — F41.9 ANXIETY: Status: ACTIVE | Noted: 2025-08-17
